# Patient Record
Sex: MALE | Race: WHITE | NOT HISPANIC OR LATINO | Employment: FULL TIME | URBAN - METROPOLITAN AREA
[De-identification: names, ages, dates, MRNs, and addresses within clinical notes are randomized per-mention and may not be internally consistent; named-entity substitution may affect disease eponyms.]

---

## 2017-11-10 PROBLEM — R09.2 RESPIRATORY ARREST: Status: ACTIVE | Noted: 2017-11-10

## 2019-03-18 ENCOUNTER — TELEPHONE (OUTPATIENT)
Dept: ENDOSCOPY | Facility: HOSPITAL | Age: 54
End: 2019-03-18

## 2019-03-18 RX ORDER — SODIUM, POTASSIUM,MAG SULFATES 17.5-3.13G
1 SOLUTION, RECONSTITUTED, ORAL ORAL DAILY
Qty: 1 KIT | Refills: 0 | Status: SHIPPED | OUTPATIENT
Start: 2019-03-18 | End: 2019-03-20

## 2019-03-18 NOTE — TELEPHONE ENCOUNTER
Endoscopy Scheduling Questionnaire:    Call Type:Incoming call    1. Have you been admitted overnight to the hospital in the past 3 months? no  2. Do you get CP and SOB while walking up a flight of stairs? no  3. Have you had a stent placed in the past 12 months? no  4. Have you had a stroke or heart attack in the past 6 months? no  5. Have you had any chest pain in the past 3 months? no      If so, have you been evaluated by your PCP or Cardiologist? no  6. Do you take weight loss medications? no  7. Have you been diagnosed with Diverticulitis within the past 3 months? no  8. Are you having any GI symptoms that you feel need to be evaluated prior to your procedure? no  9. Are you on dialysis? no  10. Are you diabetic? no  11. Do you have any other health issues that you feel might limit your ability to safely have the procedure and/or sedation? no  12. Is the patient over 81 yo? no        If so, has the patient been seen by their PCP or GI in the last 3 months? N/A       -I have reviewed the last colonoscopy for recommendations regarding surveillance and bowel prep  is NA  -I have reviewed the patient's medications and allergies. He is not on high risk medications and will require cardiac clearance. A clearance request NA  -I have verified the pharmacy information. The prep being used is Suprep. The patient's prep instructions were sent via mail..    Date Endoscopy Scheduled: Date: 5/13/19

## 2019-03-26 DIAGNOSIS — R52 PAIN: Primary | ICD-10-CM

## 2019-03-27 ENCOUNTER — HOSPITAL ENCOUNTER (OUTPATIENT)
Dept: RADIOLOGY | Facility: HOSPITAL | Age: 54
Discharge: HOME | End: 2019-03-27
Attending: ORTHOPAEDIC SURGERY
Payer: OTHER GOVERNMENT

## 2019-03-27 ENCOUNTER — OFFICE VISIT (OUTPATIENT)
Dept: ORTHOPEDICS | Facility: CLINIC | Age: 54
End: 2019-03-27
Payer: OTHER GOVERNMENT

## 2019-03-27 VITALS
SYSTOLIC BLOOD PRESSURE: 105 MMHG | RESPIRATION RATE: 12 BRPM | WEIGHT: 246.5 LBS | HEIGHT: 74 IN | HEART RATE: 68 BPM | BODY MASS INDEX: 31.63 KG/M2 | DIASTOLIC BLOOD PRESSURE: 70 MMHG

## 2019-03-27 DIAGNOSIS — G89.29 CHRONIC LEFT SHOULDER PAIN: Primary | ICD-10-CM

## 2019-03-27 DIAGNOSIS — M75.42 IMPINGEMENT SYNDROME OF LEFT SHOULDER: ICD-10-CM

## 2019-03-27 DIAGNOSIS — S43.432A SUPERIOR GLENOID LABRUM LESION OF LEFT SHOULDER, INITIAL ENCOUNTER: ICD-10-CM

## 2019-03-27 DIAGNOSIS — M25.512 CHRONIC LEFT SHOULDER PAIN: Primary | ICD-10-CM

## 2019-03-27 DIAGNOSIS — R52 PAIN: ICD-10-CM

## 2019-03-27 PROCEDURE — 99999 PR PBB SHADOW E&M-EST. PATIENT-LVL III: ICD-10-PCS | Mod: PBBFAC,,, | Performed by: ORTHOPAEDIC SURGERY

## 2019-03-27 PROCEDURE — 73030 XR SHOULDER COMPLETE 2 OR MORE VIEWS LEFT: ICD-10-PCS | Mod: 26,LT,, | Performed by: RADIOLOGY

## 2019-03-27 PROCEDURE — 99999 PR PBB SHADOW E&M-EST. PATIENT-LVL III: CPT | Mod: PBBFAC,,, | Performed by: ORTHOPAEDIC SURGERY

## 2019-03-27 PROCEDURE — 99204 PR OFFICE/OUTPT VISIT, NEW, LEVL IV, 45-59 MIN: ICD-10-PCS | Mod: S$PBB,,, | Performed by: ORTHOPAEDIC SURGERY

## 2019-03-27 PROCEDURE — 99204 OFFICE O/P NEW MOD 45 MIN: CPT | Mod: S$PBB,,, | Performed by: ORTHOPAEDIC SURGERY

## 2019-03-27 PROCEDURE — 73030 X-RAY EXAM OF SHOULDER: CPT | Mod: TC,LT

## 2019-03-27 PROCEDURE — 99213 OFFICE O/P EST LOW 20 MIN: CPT | Mod: PBBFAC,25,PN | Performed by: ORTHOPAEDIC SURGERY

## 2019-03-27 PROCEDURE — 73030 X-RAY EXAM OF SHOULDER: CPT | Mod: 26,LT,, | Performed by: RADIOLOGY

## 2019-03-27 RX ORDER — MULTIVITAMIN
1 TABLET ORAL DAILY
COMMUNITY
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
## 2019-03-28 ENCOUNTER — TELEPHONE (OUTPATIENT)
Dept: PAIN MEDICINE | Facility: CLINIC | Age: 54
End: 2019-03-28

## 2019-03-28 DIAGNOSIS — M19.012 PRIMARY OSTEOARTHRITIS OF LEFT SHOULDER: Primary | ICD-10-CM

## 2019-03-28 NOTE — TELEPHONE ENCOUNTER
----- Message from Iona Kenyon LPN sent at 3/27/2019  4:12 PM CDT -----  Can you place orders for me?    ----- Message -----  From: Zaira Pablo LPN  Sent: 3/27/2019   3:54 PM  To: Tobias Patterson Staff    Good afternoon,    Dr. Patton would like for this patient to be scheduled with Dr. Collado for a left shoulder injection ( x-ray guided).

## 2019-03-28 NOTE — TELEPHONE ENCOUNTER
----- Message from Viviana Lea PA-C sent at 3/28/2019  7:19 AM CDT -----  Done... Are we not seeing them anymore in clinic before scheduling procedures?    ----- Message -----  From: Iona Kenyon LPN  Sent: 3/27/2019   4:12 PM  To: Viviana Lea PA-C    Can you place orders for me?    ----- Message -----  From: Zaira Pablo LPN  Sent: 3/27/2019   3:54 PM  To: Tobias Patterson Staff    Good afternoon,    Dr. Patton would like for this patient to be scheduled with Dr. Collado for a left shoulder injection ( x-ray guided).

## 2019-03-29 ENCOUNTER — TELEPHONE (OUTPATIENT)
Dept: PAIN MEDICINE | Facility: CLINIC | Age: 54
End: 2019-03-29

## 2019-03-29 NOTE — TELEPHONE ENCOUNTER
----- Message from Rubina Dale sent at 3/29/2019 12:49 PM CDT -----  Contact: self- [phone]  ..Type:  Patient Returning Call    Who Called:Colby  Who Left Message for Patient:nurse  Does the patient know what this is regarding?:setting an appointment  Would the patient rather a call back or a response via [facility]? Call back   Best Call Back Number:[phone]  Additional Information:

## 2019-04-02 ENCOUNTER — TELEPHONE (OUTPATIENT)
Dept: ORTHOPEDICS | Facility: CLINIC | Age: 54
End: 2019-04-02

## 2019-04-02 NOTE — TELEPHONE ENCOUNTER
Contacted patient in reference to rescheduling his 7/1/19 appointment. No answer. Left voice message.//DG

## 2019-04-08 ENCOUNTER — TELEPHONE (OUTPATIENT)
Dept: ORTHOPEDICS | Facility: CLINIC | Age: 54
End: 2019-04-08

## 2019-04-17 ENCOUNTER — HOSPITAL ENCOUNTER (OUTPATIENT)
Facility: HOSPITAL | Age: 54
Discharge: HOME | End: 2019-04-17
Attending: ANESTHESIOLOGY | Admitting: ANESTHESIOLOGY
Payer: OTHER GOVERNMENT

## 2019-04-17 ENCOUNTER — APPOINTMENT (OUTPATIENT)
Dept: RADIOLOGY | Facility: HOSPITAL | Age: 54
End: 2019-04-17
Attending: PHYSICIAN ASSISTANT
Payer: OTHER GOVERNMENT

## 2019-04-17 VITALS
RESPIRATION RATE: 16 BRPM | HEART RATE: 55 BPM | HEIGHT: 74 IN | WEIGHT: 241 LBS | TEMPERATURE: 98 F | OXYGEN SATURATION: 97 % | BODY MASS INDEX: 30.93 KG/M2 | DIASTOLIC BLOOD PRESSURE: 69 MMHG | SYSTOLIC BLOOD PRESSURE: 120 MMHG

## 2019-04-17 DIAGNOSIS — M19.012 PRIMARY OSTEOARTHRITIS OF LEFT SHOULDER: Primary | ICD-10-CM

## 2019-04-17 PROCEDURE — 20610 PR DRAIN/INJECT LARGE JOINT/BURSA: ICD-10-PCS | Mod: LT,,, | Performed by: ANESTHESIOLOGY

## 2019-04-17 PROCEDURE — 20610 DRAIN/INJ JOINT/BURSA W/O US: CPT | Mod: LT,,, | Performed by: ANESTHESIOLOGY

## 2019-04-17 PROCEDURE — 63600175 PHARM REV CODE 636 W HCPCS

## 2019-04-17 PROCEDURE — 20610 DRAIN/INJ JOINT/BURSA W/O US: CPT | Mod: LT

## 2019-04-17 PROCEDURE — 77002 NEEDLE LOCALIZATION BY XRAY: CPT

## 2019-04-17 PROCEDURE — 77002 PR FLUOROSCOPIC GUIDANCE NEEDLE PLACEMENT: ICD-10-PCS | Mod: 26,,, | Performed by: ANESTHESIOLOGY

## 2019-04-17 PROCEDURE — 25000003 PHARM REV CODE 250

## 2019-04-17 PROCEDURE — 77002 NEEDLE LOCALIZATION BY XRAY: CPT | Mod: 26,,, | Performed by: ANESTHESIOLOGY

## 2019-04-17 PROCEDURE — 25000003 PHARM REV CODE 250: Performed by: ANESTHESIOLOGY

## 2019-04-17 PROCEDURE — 77002 NEEDLE LOCALIZATION BY XRAY: CPT | Mod: TC

## 2019-04-17 PROCEDURE — 63600175 PHARM REV CODE 636 W HCPCS: Performed by: ANESTHESIOLOGY

## 2019-04-17 RX ORDER — METHYLPREDNISOLONE ACETATE 40 MG/ML
INJECTION, SUSPENSION INTRA-ARTICULAR; INTRALESIONAL; INTRAMUSCULAR; SOFT TISSUE
Status: DISCONTINUED | OUTPATIENT
Start: 2019-04-17 | End: 2019-04-17 | Stop reason: HOSPADM

## 2019-04-17 RX ORDER — LIDOCAINE HYDROCHLORIDE 20 MG/ML
INJECTION, SOLUTION EPIDURAL; INFILTRATION; INTRACAUDAL; PERINEURAL
Status: DISCONTINUED | OUTPATIENT
Start: 2019-04-17 | End: 2019-04-17 | Stop reason: HOSPADM

## 2019-04-17 NOTE — PLAN OF CARE
Patient dc'd home with family via wheelchair, verbalized understanding of DC instructions, voiced no complaints.  Patient stood at side of bed and transferred to  with no new motor or neurodeficits, nuerologically intact

## 2019-04-17 NOTE — DISCHARGE SUMMARY
[facility]  Discharge Note       Description of Procedure: Left Shoulder Pain under Fluoroscopic Guidance    Procedure Date: 4/17/2019    Admit Date: 4/17/2019  Discharge Date: 4/17/2019     Attending Physician: Tobias Patterson   Discharge Provider: Tobias Patterson    Preoperative Diagnosis: Left Shoulder Pain     Postoperative Diagnosis: as above, same as preoperative diagnosis    Discharged Condition: Stable    Hospital Course: Patient was admitted for an outpatient procedure. The procedure was tolerated well with no complications.    Final Diagnoses: Same as principal problem.    Disposition: Home, self-care.    Follow up/Patient Instructions:  Follow-up in clinic in 2-3 weeks.    Medications: No medications were prescribed today. The patient was advised to resume normal medication regimen without change.  Specific information was provided regarding restarting any anticoagulant/s.    Discharge Procedure Orders (must include Diet, Follow-up, Activity):  Light activity for the remainder of the day, resume normal activity tomorrow. Resume normal diet. Follow-up in clinic in 2-3 weeks.

## 2019-04-17 NOTE — OP NOTE
PROCEDURE: Shoulder injection under fluoroscopic guidance     SIDE: Left       PROCEDURE DATE: 4/17/2019     PREOPERATIVE DIAGNOSIS: Shoulder pain  POSTOPERATIVE DIAGNOSIS: same     PROVIDER: Leonardo Collado MD  Assistant(s): None     ANESTHESIA: Local     INDICATION: The patient has a history of pain due to sacroiliitis unresponsive to conservative treatments. Fluoroscopy was used to optimize visualization of needle placement and to maximize safety.         PROCEDURE DESCRIPTION: The patient was seen and identified in the preoperative area. Risks, benefits, complications, and alternatives were discussed with the patient. The patient agreed to proceed with the procedure and signed the consent. The site and side of the procedure was identified and marked. An IV was not started.     The patient was taken to the procedural suite. The patient was positioned in prone orientation on procedure table. A time out was performed prior to any intervention. The procedure, site, side, and allergies were stated and agreed to by all present. The lumbosacral area was widely prepped with ChloraPrep. The procedural site was draped in usual sterile fashion. Vital signs were closely monitored throughout this procedure. Conscious sedation was not used for this procedure.         Shoulder Injection:  The Left  shoulder was prepped and draped in the usual sterile fashion with ChloraPrep. 1% lidocaine through a 27-gauge needle was used to anesthetize the target area, and then a 3-1/2 inch 25-gauge Quincke-point needle advanced under direct vision fluoroscopy toward the Left -sided shoulder joint through the lateral approach, and the placement of the needle was confirmed by injecting 1 mL of iodine contrast under live fluoroscopy, which demonstrated excellent placement of the needle. Aspiration was negative for blood and CSF and air. Subsequently 3 mL solution containing 1 mL of Methylprednisolone (40 mg/mL) and 2 mL 1% PF Lidocaine was  injected, and then the needle was withdrawn. The patient tolerated the procedure very well and was brought to the postprocedure recovery room in excellent condition     Description of Findings: Not applicable     Prosthetic devices, grafts, tissues, or devices implanted: None     Specimen Removed: No     Estimated Blood Loss: minimal     COMPLICATIONS: None     DISPOSITION / PLANS: The patient was transferred to the recovery area in a stable condition for observation. The patient was reexamined prior to discharge. There was no evidence of acute neurologic injury following the procedure.  Patient was discharged from the recovery room after meeting discharge criteria. Home discharge instructions were given to the patient by the staff.

## 2019-05-13 ENCOUNTER — ANESTHESIA (OUTPATIENT)
Dept: ENDOSCOPY | Facility: HOSPITAL | Age: 54
End: 2019-05-13
Payer: OTHER GOVERNMENT

## 2019-05-13 ENCOUNTER — HOSPITAL ENCOUNTER (OUTPATIENT)
Facility: HOSPITAL | Age: 54
Discharge: HOME | End: 2019-05-13
Attending: INTERNAL MEDICINE | Admitting: INTERNAL MEDICINE
Payer: OTHER GOVERNMENT

## 2019-05-13 ENCOUNTER — ANESTHESIA EVENT (OUTPATIENT)
Dept: ENDOSCOPY | Facility: HOSPITAL | Age: 54
End: 2019-05-13
Payer: OTHER GOVERNMENT

## 2019-05-13 VITALS
DIASTOLIC BLOOD PRESSURE: 74 MMHG | HEART RATE: 68 BPM | RESPIRATION RATE: 18 BRPM | HEIGHT: 74 IN | WEIGHT: 234.13 LBS | OXYGEN SATURATION: 97 % | BODY MASS INDEX: 30.05 KG/M2 | SYSTOLIC BLOOD PRESSURE: 107 MMHG | TEMPERATURE: 98 F

## 2019-05-13 DIAGNOSIS — Z12.11 COLON CANCER SCREENING: Primary | ICD-10-CM

## 2019-05-13 PROCEDURE — 63600175 PHARM REV CODE 636 W HCPCS: Performed by: NURSE ANESTHETIST, CERTIFIED REGISTERED

## 2019-05-13 PROCEDURE — G0121 COLON CA SCRN NOT HI RSK IND: ICD-10-PCS | Mod: ,,, | Performed by: INTERNAL MEDICINE

## 2019-05-13 PROCEDURE — G0121 COLON CA SCRN NOT HI RSK IND: HCPCS | Mod: ,,, | Performed by: INTERNAL MEDICINE

## 2019-05-13 PROCEDURE — 37000009 HC ANESTHESIA EA ADD 15 MINS: Performed by: INTERNAL MEDICINE

## 2019-05-13 PROCEDURE — 37000008 HC ANESTHESIA 1ST 15 MINUTES: Performed by: INTERNAL MEDICINE

## 2019-05-13 PROCEDURE — 00812 ANES LWR INTST SCR COLSC: CPT | Performed by: INTERNAL MEDICINE

## 2019-05-13 PROCEDURE — 25000003 PHARM REV CODE 250: Performed by: INTERNAL MEDICINE

## 2019-05-13 PROCEDURE — 25000003 PHARM REV CODE 250: Performed by: NURSE ANESTHETIST, CERTIFIED REGISTERED

## 2019-05-13 PROCEDURE — G0121 COLON CA SCRN NOT HI RSK IND: HCPCS | Performed by: INTERNAL MEDICINE

## 2019-05-13 RX ORDER — PROPOFOL 10 MG/ML
VIAL (ML) INTRAVENOUS
Status: DISCONTINUED | OUTPATIENT
Start: 2019-05-13 | End: 2019-05-13

## 2019-05-13 RX ORDER — SODIUM CHLORIDE, SODIUM LACTATE, POTASSIUM CHLORIDE, CALCIUM CHLORIDE 600; 310; 30; 20 MG/100ML; MG/100ML; MG/100ML; MG/100ML
INJECTION, SOLUTION INTRAVENOUS CONTINUOUS
Status: DISCONTINUED | OUTPATIENT
Start: 2019-05-13 | End: 2019-05-13 | Stop reason: HOSPADM

## 2019-05-13 RX ORDER — LIDOCAINE HYDROCHLORIDE 10 MG/ML
INJECTION INFILTRATION; PERINEURAL
Status: DISCONTINUED | OUTPATIENT
Start: 2019-05-13 | End: 2019-05-13

## 2019-05-13 RX ADMIN — PROPOFOL 30 MG: 10 INJECTION, EMULSION INTRAVENOUS at 01:05

## 2019-05-13 RX ADMIN — PROPOFOL 40 MG: 10 INJECTION, EMULSION INTRAVENOUS at 01:05

## 2019-05-13 RX ADMIN — PROPOFOL 110 MG: 10 INJECTION, EMULSION INTRAVENOUS at 01:05

## 2019-05-13 RX ADMIN — SODIUM CHLORIDE, SODIUM LACTATE, POTASSIUM CHLORIDE, AND CALCIUM CHLORIDE: .6; .31; .03; .02 INJECTION, SOLUTION INTRAVENOUS at 12:05

## 2019-05-13 RX ADMIN — LIDOCAINE HYDROCHLORIDE 50 MG: 10 INJECTION, SOLUTION INFILTRATION; PERINEURAL at 01:05

## 2019-05-13 NOTE — ANESTHESIA PREPROCEDURE EVALUATION
05/13/2019  Colby Yepez is a 54 y.o., male.    Anesthesia Evaluation    I have reviewed the Patient Summary Reports.    I have reviewed the Nursing Notes.   I have reviewed the Medications.     Review of Systems  Anesthesia Hx:  No problems with previous Anesthesia    Social:  Non-Smoker    Hematology/Oncology:  Hematology Normal   Oncology Normal     EENT/Dental:EENT/Dental Normal   Cardiovascular:  Cardiovascular Normal     Pulmonary:  Pulmonary Normal    Renal/:   Chronic Renal Disease renal calculi    Hepatic/GI:   Bowel Prep. Liver Disease,    Musculoskeletal:   Arthritis     Neurological:  Neurology Normal    Endocrine:  Endocrine Normal    Dermatological:  Skin Normal    Psych:  Psychiatric Normal           Physical Exam  General:  Well nourished    Airway/Jaw/Neck:  Airway Findings: Mouth Opening: Normal General Airway Assessment: Adult       Chest/Lungs:  Chest/Lungs Findings: Clear to auscultation     Heart/Vascular:  Heart Findings: Rate: Normal             Anesthesia Plan  Type of Anesthesia, risks & benefits discussed:  Anesthesia Type:  MAC  Patient's Preference:   Intra-op Monitoring Plan: standard ASA monitors  Intra-op Monitoring Plan Comments:   Post Op Pain Control Plan:   Post Op Pain Control Plan Comments:   Induction:   IV  Beta Blocker:  Patient is not currently on a Beta-Blocker (No further documentation required).       Informed Consent: Patient understands risks and agrees with Anesthesia plan.  Questions answered. Anesthesia consent signed with patient.  ASA Score: 2     Day of Surgery Review of History & Physical: I have interviewed and examined the patient. I have reviewed the patient's H&P dated:  There are no significant changes.          Ready For Surgery From Anesthesia Perspective.

## 2019-05-13 NOTE — ANESTHESIA RELEASE NOTE
"Anesthesia Release from PACU Note    Patient: Colby Yeepz    Procedure(s) Performed: Procedure(s) (LRB):  COLONOSCOPY (N/A)    Anesthesia type: MAC    Post pain: Adequate analgesia    Post assessment: no apparent anesthetic complications    Last Vitals:   Visit Vitals  /72 (BP Location: Left arm, Patient Position: Lying)   Pulse 67   Temp 36.7 °C (98.1 °F) (Skin)   Resp 18   Ht 6' 2" (1.88 m)   Wt 106.2 kg (234 lb 2.1 oz)   SpO2 96%   BMI 30.06 kg/m²       Post vital signs: stable    Level of consciousness: awake    Nausea/Vomiting: no nausea/no vomiting    Complications: none    Airway Patency: patent    Respiratory: unassisted    Cardiovascular: stable and blood pressure at baseline    Hydration: euvolemic  "

## 2019-05-13 NOTE — [employer] PATIENT INSTRUCTIONS
Discharge Summary/Instructions after an Endoscopic Procedure  Patient Name: Colby Yepez  Patient MRN: [id number]  Patient YOB: 1965  Monday, May 13, 2019 Soha Norotn MD  RESTRICTIONS:  During your procedure today, you received medications for sedation.  These   medications may affect your judgment, balance and coordination.  Therefore,   for 24 hours, you have the following restrictions:   - DO NOT drive a car, operate machinery, make legal/financial decisions,   sign important papers or drink alcohol.    ACTIVITY:  Today: no heavy lifting, straining or running due to procedural   sedation/anesthesia.  The following day: return to full activity including work.  DIET:  Eat and drink normally unless instructed otherwise.     TREATMENT FOR COMMON SIDE EFFECTS:  - Mild abdominal pain, nausea, belching, bloating or excessive gas:  rest,   eat lightly and use a heating pad.  - Sore Throat: treat with throat lozenges and/or gargle with warm salt   water.  - Because air was used during the procedure, expelling large amounts of air   from your rectum or belching is normal.  - If a bowel prep was taken, you may not have a bowel movement for 1-3 days.    This is normal.  SYMPTOMS TO WATCH FOR AND REPORT TO YOUR PHYSICIAN:  1. Abdominal pain or bloating, other than gas cramps.  2. Chest pain.  3. Back pain.  4. Signs of infection such as: chills or fever occurring within 24 hours   after the procedure.  5. Rectal bleeding, which would show as bright red, maroon, or black stools.   (A tablespoon of blood from the rectum is not serious, especially if   hemorrhoids are present.)  6. Vomiting.  7. Weakness or dizziness.  GO DIRECTLY TO THE NEAREST EMERGENCY ROOM IF YOU HAVE ANY OF THE FOLLOWING:      Difficulty breathing              Chills and/or fever over 101 F   Persistent vomiting and/or vomiting blood   Severe abdominal pain   Severe chest pain   Black, tarry stools   Bleeding- more than one  tablespoon   Any other symptom or condition that you feel may need urgent attention  Your doctor recommends these additional instructions:  If any biopsies were taken, your doctors clinic will contact you in 1 to 2   weeks with any results.  - Patient has a contact number available for emergencies.  The signs and   symptoms of potential delayed complications were discussed with the   patient.  Return to normal activities tomorrow.  Written discharge   instructions were provided to the patient.   - Discharge patient to home (via wheelchair).   - Resume previous diet today.   - Continue present medications.   - Repeat colonoscopy in 10 years for screening purposes.  For questions, problems or results please call your physician Soha Norton MD at Work:  [phone]  If you have any questions about the above instructions, call the GI   department at [phone] or call the endoscopy unit at [phone]   from 7am until 3 pm.  [facility] [place], EMERGENCY ROOM PHONE NUMBER:   [phone]  IF A COMPLICATION OR EMERGENCY SITUATION ARISES AND YOU ARE UNABLE TO REACH   YOUR PHYSICIAN - GO DIRECTLY TO THE EMERGENCY ROOM.  I have read or have had read to me these discharge instructions for my   procedure and have received a written copy.  I understand these   instructions and will follow-up with my physician if I have any questions.     __________________________________       _____________________________________  Nurse Signature                                          Patient/Designated   Responsible Party Signature  MD Soha Giraldo MD  5/13/2019 1:32:41 PM  This report has been verified and signed electronically.  [employer]

## 2019-05-13 NOTE — H&P
PRE PROCEDURE H&P    Patient Name: Colby Yepez  MRN: [id number]  : 1965  Date of Procedure:  2019  Referring Physician: Cuong Riojas MD  Primary Physician: Cuong Riojas MD  Procedure Physician: Soha Norton MD       Planned Procedure: Colonoscopy  Diagnosis: screening for colon cancer  Chief Complaint: Same as above    HPI: Patient is an 54 y.o. male is here for the above.     Last colonoscopy: no prior   Family history: negative   Anticoagulation: none     Past Medical History:   Past Medical History:   Diagnosis Date    Allergy     Kidney stone     Liver cyst         Past Surgical History:  Past Surgical History:   Procedure Laterality Date    SINUS SURGERY      TONSILLECTOMY, ADENOIDECTOMY, BILATERAL MYRINGOTOMY AND TUBES      VASECTOMY  10/27/2017        Home Medications:  Prior to Admission medications    Medication Sig Start Date End Date Taking? Authorizing Provider   azithromycin (ZITHROMAX Z-MARLY) 250 MG tablet tad 19  Yes Cuong Riojas MD   ibuprofen (ADVIL,MOTRIN) 800 MG tablet TAKE ONE TABLET BY MOUTH THREE TIMES DAILY AS NEEDED FOR PAIN 14  Yes Cuong Riojas MD   ipratropium (ATROVENT) 0.03 % nasal spray 2 sprays by Nasal route 3 (three) times daily. 19 Yes Cuong Riojas MD   meloxicam (MOBIC) 15 MG tablet Take 1 tablet (15 mg total) by mouth once daily. 4/3/19 4/2/20 Yes Cuong Riojas MD   multivitamin (ONE DAILY MULTIVITAMIN) per tablet Take 1 tablet by mouth.   Yes Historical Provider, MD   fluticasone (FLONASE) 50 mcg/actuation nasal spray 2 sprays by Each Nare route once daily. 14   Cuong Riojas MD   tiZANidine (ZANAFLEX) 4 MG tablet Take 1 tablet (4 mg total) by mouth 3 (three) times daily as needed. 4/3/19 4/2/20  Cuong Riojas MD        Allergies:  Review of patient's allergies indicates:   Allergen Reactions    Tramadol         Social History:   Social History     Socioeconomic History    Marital status:      Spouse name:  "Not on file    Number of children: 3    Years of education: Not on file    Highest education level: Not on file   Occupational History     Employer:  [employer]   Social Needs    Financial resource strain: Not on file    Food insecurity:     Worry: Not on file     Inability: Not on file    Transportation needs:     Medical: Not on file     Non-medical: Not on file   Tobacco Use    Smoking status: Never Smoker    Smokeless tobacco: Never Used   Substance and Sexual Activity    Alcohol use: Yes     Comment: monthly    Drug use: No    Sexual activity: Not on file   Lifestyle    Physical activity:     Days per week: Not on file     Minutes per session: Not on file    Stress: Not on file   Relationships    Social connections:     Talks on phone: Not on file     Gets together: Not on file     Attends Restoration service: Not on file     Active member of club or organization: Not on file     Attends meetings of clubs or organizations: Not on file     Relationship status: Not on file   Other Topics Concern    Not on file   Social History Narrative    Not on file       Family History:  History reviewed. No pertinent family history.    ROS: No acute cardiac events, no acute respiratory complaints.     Physical Exam (all patients):    /72 (BP Location: Left arm, Patient Position: Lying)   Pulse 67   Temp 98.1 °F (36.7 °C) (Skin)   Resp 18   Ht 6' 2" (1.88 m)   Wt 106.2 kg (234 lb 2.1 oz)   SpO2 96%   BMI 30.06 kg/m²   Lungs: Clear to auscultation bilaterally, respirations unlabored  Heart: Regular rate and rhythm, S1 and S2 normal, no obvious murmurs  Abdomen:         Soft, non-tender, bowel sounds normal, no masses, no organomegaly    Lab Results   Component Value Date    WBC 6.7 03/18/2019    MCV 94 03/18/2019    RDW 13.2 03/18/2019     (L) 03/18/2019    INR 1.2 08/11/2010    GLU 83 03/18/2019    BUN 21 03/18/2019     03/18/2019    K 4.4 03/18/2019     03/18/2019    "     SEDATION PLAN: per anesthesia      History reviewed, vital signs satisfactory, cardiopulmonary status satisfactory, sedation options, risks and plans have been discussed with the patient  All their questions were answered and the patient agrees to the sedation procedures as planned and the patient is deemed an appropriate candidate for the sedation as planned.    Procedure explained to patient, informed consent obtained and placed in chart.    Soha Norton  5/13/2019  1:15 PM

## 2019-05-13 NOTE — ANESTHESIA POSTPROCEDURE EVALUATION
Anesthesia Post Evaluation    Patient: Colby Yepez    Procedure(s) Performed: Procedure(s) (LRB):  COLONOSCOPY (N/A)    Final Anesthesia Type: MAC  Patient location during evaluation: PACU  Patient participation: Yes- Able to Participate  Level of consciousness: awake and alert  Post-procedure vital signs: reviewed and stable  Pain management: adequate  Airway patency: patent  PONV status at discharge: No PONV  Anesthetic complications: no      Cardiovascular status: blood pressure returned to baseline  Respiratory status: unassisted  Hydration status: euvolemic  Follow-up not needed.          Vitals Value Taken Time   /72 5/13/2019 12:15 PM   Temp 36.7 °C (98.1 °F) 5/13/2019 12:15 PM   Pulse 67 5/13/2019 12:15 PM   Resp 18 5/13/2019 12:15 PM   SpO2 96 % 5/13/2019 12:15 PM         No case tracking events are documented in the log.      Pain/Kevin Score: Kevin Score: 9 (5/13/2019  1:35 PM)

## 2019-05-13 NOTE — TRANSFER OF CARE
"Anesthesia Transfer of Care Note    Patient: Colby Yepez    Procedure(s) Performed: Procedure(s) (LRB):  COLONOSCOPY (N/A)    Patient location: PACU    Anesthesia Type: MAC    Transport from OR: Transported from OR on room air with adequate spontaneous ventilation    Post pain: adequate analgesia    Post assessment: no apparent anesthetic complications    Post vital signs: stable    Level of consciousness: awake    Nausea/Vomiting: no nausea/vomiting    Complications: none    Transfer of care protocol was followed      Last vitals:   Visit Vitals  /72 (BP Location: Left arm, Patient Position: Lying)   Pulse 67   Temp 36.7 °C (98.1 °F) (Skin)   Resp 18   Ht 6' 2" (1.88 m)   Wt 106.2 kg (234 lb 2.1 oz)   SpO2 96%   BMI 30.06 kg/m²     "

## 2019-05-13 NOTE — DISCHARGE SUMMARY
Endoscopy Discharge Summary      Admit Date: 5/13/2019    Discharge Date and Time:  5/13/2019 1:33 PM    Attending Physician: Soha Norton MD     Discharge Physician: Soha Norton MD     Principal Admitting Diagnoses: Colon cancer screening         Discharge Diagnosis: The encounter diagnosis was Colon cancer screening.     Discharged Condition: Good    Indication for Admission: Colon cancer screening     Hospital Course: Patient was admitted for an inpatient procedure and tolerated the procedure well with no complications.    Significant Diagnostic Studies: Colonoscopy     Pathology (if any):  Specimen (12h ago, onward)    None          Estimated Blood Loss: 0 ml.    Discussed with: patient.    Disposition: Home.    Follow Up/Patient Instructions:   Current Discharge Medication List      CONTINUE these medications which have NOT CHANGED    Details   azithromycin (ZITHROMAX Z-MARLY) 250 MG tablet tad  Qty: 6 tablet, Refills: 0    Associated Diagnoses: Acute non-recurrent maxillary sinusitis      ibuprofen (ADVIL,MOTRIN) 800 MG tablet TAKE ONE TABLET BY MOUTH THREE TIMES DAILY AS NEEDED FOR PAIN  Qty: 60 tablet, Refills: 5      ipratropium (ATROVENT) 0.03 % nasal spray 2 sprays by Nasal route 3 (three) times daily.  Qty: 30 mL, Refills: 0    Associated Diagnoses: Acute non-recurrent maxillary sinusitis      meloxicam (MOBIC) 15 MG tablet Take 1 tablet (15 mg total) by mouth once daily.  Qty: 30 tablet, Refills: 2    Associated Diagnoses: Lumbar pain      multivitamin (ONE DAILY MULTIVITAMIN) per tablet Take 1 tablet by mouth.      fluticasone (FLONASE) 50 mcg/actuation nasal spray 2 sprays by Each Nare route once daily.  Qty: 16 g, Refills: 11    Associated Diagnoses: Allergic rhinitis, cause unspecified      tiZANidine (ZANAFLEX) 4 MG tablet Take 1 tablet (4 mg total) by mouth 3 (three) times daily as needed.  Qty: 40 tablet, Refills: 1    Associated Diagnoses: Lumbar pain             Discharge Procedure  Orders   Diet general     Call MD for:  temperature >100.4     Call MD for:  persistent nausea and vomiting     Call MD for:  severe uncontrolled pain     Call MD for:  difficulty breathing, headache or visual disturbances     Activity as tolerated       Follow-up Information     Cuong Riojas MD.    Specialty:  Family Medicine  Why:  As needed  Contact information:  [address] [facility]  [address]  [facility] [id number] [phone]

## 2019-05-13 NOTE — PLAN OF CARE
Dr Norton came to bedside and discussed findings. NO N/V,  no abdominal pain, no GI bleeding, and vitals stable.  Pt discharged from unit.

## 2019-05-13 NOTE — DISCHARGE INSTRUCTIONS
Diverticulosis    Diverticulosis means that small pouches have formed in the wall of your large intestine (colon). Most often, this problem causes no symptoms and is common as people age. But the pouches in the colon are at risk of becoming infected. When this happens, the condition is called diverticulitis. Although most people with diverticulosis never develop diverticulitis, it is still not uncommon. Rectal bleeding can also occur and in less common situations, a type of colon inflammation called colitis.  While most people do not have symptoms, some people with diverticulosis may have:  · Abdominal cramps and pain  · Bloating  · Constipation  · Change in bowel habits  Causes  The exact cause of diverticulosis (and diverticulitis) has not been proved, but a few things are associated with the condition:  · Low-fiber diet  · Constipation  · Lack of exercise  Your healthcare provider will talk with you about how to manage your condition. Diet changes may be all that are needed to help control diverticulosis and prevent progression to diverticulitis. If you develop diverticulitis, you will likely need other treatments.  Home care  You may be told to take fiber supplements daily. Fiber adds bulk to the stool so that it passes through the colon more easily. Stool softeners may be recommended. You may also be given medications for pain relief. Be sure to take all medications as directed.  In the past, people were told to avoid corn, nuts, and seeds. This is no longer necessary.  Follow these guidelines when caring for yourself at home:  · Eat unprocessed foods that are high in fiber. Whole grains, fruits, and vegetables are good choices.  · Drink 6 to 8 glasses of water every day unless your healthcare provider has you limit how much fluid you should have.  · Watch for changes in your bowel movements. Tell your provider if you notice any changes.  · Begin an exercise program. Ask your provider how to get started.  Generally, walking is the best.  · Get plenty of rest and sleep.  Follow-up care  Follow up with your healthcare provider, or as advised. Regular visits may be needed to check on your health. Sometimes special procedures such as colonoscopy, are needed after an episode of diverticulitis or blooding. Be sure to keep all your appointments.  If a stool sample was taken, or cultures were done, you should be told if they are positive, or if your treatment needs to be changed. You can call as directed for the results.  If X-rays were done, a radiologist will look at them. You will be told if there is a change in your treatment.  If antibiotics were prescribed, be sure to finish them all.  When to seek medical advice  Call your healthcare provider right away if any of these occur:  · Fever of 100.4°F (38°C) or higher, or as directed by your healthcare provider  · Severe cramps in the lower left side of the abdomen or pain that is getting worse  · Tenderness in the lower left side of the abdomen or worsening pain throughout the abdomen  · Diarrhea or constipation that doesn't get better within 24 hours  · Nausea and vomiting  · Bleeding from the rectum  Call 911  Call emergency services if any of the following occur:  · Trouble breathing  · Confusion  · Very drowsy or trouble awakening  · Fainting or loss of consciousness  · Rapid heart rate  · Chest pain  Date Last Reviewed: 12/30/2015 © 2000-2017 [employer]. [address] [place] [id number]. All rights reserved. This information is not intended as a substitute for professional medical care. Always follow your healthcare professional's instructions.        Colonoscopy     A camera attached to a flexible tube with a viewing lens is used to take video pictures.     Colonoscopy is a test to view the inside of your lower digestive tract (colon and rectum). Sometimes it can show the last part of the small intestine (ileum). During the test, small pieces  of tissue may be removed for testing. This is called a biopsy. Small growths, such as polyps, may also be removed.   Why is colonoscopy done?  The test is done to help look for colon cancer. And it can help find the source of abdominal pain, bleeding, and changes in bowel habits. It may be needed once a year, depending on factors such as your:  · Age  · Health history  · Family health history  · Symptoms  · Results from any prior colonoscopy  Risks and possible complications  These include:  · Bleeding               · A puncture or tear in the colon   · Risks of anesthesia  · A cancer lesion not being seen  Getting ready   To prepare for the test:  · Talk with your healthcare provider about the risks of the test (see below). Also ask your healthcare provider about alternatives to the test.  · Tell your healthcare provider about any medicines you take. Also tell him or her about any health conditions you may have.  · Make sure your rectum and colon are empty for the test. Follow the diet and bowel prep instructions exactly. If you dont, the test may need to be rescheduled.  · Plan for a friend or family member to drive you home after the test.     Colonoscopy provides an inside view of the entire colon.     You may discuss the results with your doctor right away or at a future visit.  During the test   The test is usually done in the hospital on an outpatient basis. This means you go home the same day. The procedure takes about 30 minutes. During that time:  · You are given relaxing (sedating) medicine through an IV line. You may be drowsy, or fully asleep.  · The healthcare provider will first give you a physical exam to check for anal and rectal problems.  · Then the anus is lubricated and the scope inserted.  · If you are awake, you may have a feeling similar to needing to have a bowel movement. You may also feel pressure as air is pumped into the colon. Its OK to pass gas during the procedure.  · Biopsy, polyp  removal, or other treatments may be done during the test.  After the test   You may have gas right after the test. It can help to try to pass it to help prevent later bloating. Your healthcare provider may discuss the results with you right away. Or you may need to schedule a follow-up visit to talk about the results. After the test, you can go back to your normal eating and other activities. You may be tired from the sedation and need to rest for a few hours.  Date Last Reviewed: 11/1/2016 © 2000-2017 [employer]. [address] [place] [id number]. All rights reserved. This information is not intended as a substitute for professional medical care. Always follow your healthcare professional's instructions.

## 2019-07-02 ENCOUNTER — OFFICE VISIT (OUTPATIENT)
Dept: ORTHOPEDICS | Facility: CLINIC | Age: 54
End: 2019-07-02
Payer: OTHER GOVERNMENT

## 2019-07-02 VITALS
HEART RATE: 54 BPM | SYSTOLIC BLOOD PRESSURE: 108 MMHG | WEIGHT: 234 LBS | DIASTOLIC BLOOD PRESSURE: 67 MMHG | HEIGHT: 74 IN | BODY MASS INDEX: 30.03 KG/M2

## 2019-07-02 DIAGNOSIS — M67.814 TENDINOSIS OF LEFT SHOULDER: ICD-10-CM

## 2019-07-02 DIAGNOSIS — G89.29 CHRONIC LEFT SHOULDER PAIN: Primary | ICD-10-CM

## 2019-07-02 DIAGNOSIS — M75.42 IMPINGEMENT SYNDROME OF LEFT SHOULDER: ICD-10-CM

## 2019-07-02 DIAGNOSIS — M25.512 CHRONIC LEFT SHOULDER PAIN: Primary | ICD-10-CM

## 2019-07-02 DIAGNOSIS — S43.432A SUPERIOR GLENOID LABRUM LESION OF LEFT SHOULDER, INITIAL ENCOUNTER: ICD-10-CM

## 2019-07-02 DIAGNOSIS — M75.82 TENDONITIS OF LEFT ROTATOR CUFF: ICD-10-CM

## 2019-07-02 PROCEDURE — 99213 OFFICE O/P EST LOW 20 MIN: CPT | Mod: PBBFAC | Performed by: ORTHOPAEDIC SURGERY

## 2019-07-02 PROCEDURE — 99999 PR PBB SHADOW E&M-EST. PATIENT-LVL III: ICD-10-PCS | Mod: PBBFAC,,, | Performed by: ORTHOPAEDIC SURGERY

## 2019-07-02 PROCEDURE — 99214 OFFICE O/P EST MOD 30 MIN: CPT | Mod: S$PBB,,, | Performed by: ORTHOPAEDIC SURGERY

## 2019-07-02 PROCEDURE — 99999 PR PBB SHADOW E&M-EST. PATIENT-LVL III: CPT | Mod: PBBFAC,,, | Performed by: ORTHOPAEDIC SURGERY

## 2019-07-02 PROCEDURE — 99214 PR OFFICE/OUTPT VISIT, EST, LEVL IV, 30-39 MIN: ICD-10-PCS | Mod: S$PBB,,, | Performed by: ORTHOPAEDIC SURGERY
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
## 2019-07-25 ENCOUNTER — OFFICE VISIT (OUTPATIENT)
Dept: PAIN MEDICINE | Facility: CLINIC | Age: 54
End: 2019-07-25
Payer: OTHER GOVERNMENT

## 2019-07-25 ENCOUNTER — HOSPITAL ENCOUNTER (OUTPATIENT)
Facility: HOSPITAL | Age: 54
Discharge: HOME | End: 2019-07-25
Attending: ANESTHESIOLOGY | Admitting: ANESTHESIOLOGY
Payer: OTHER GOVERNMENT

## 2019-07-25 VITALS
SYSTOLIC BLOOD PRESSURE: 109 MMHG | HEART RATE: 50 BPM | BODY MASS INDEX: 30.17 KG/M2 | OXYGEN SATURATION: 97 % | WEIGHT: 235.13 LBS | HEIGHT: 74 IN | RESPIRATION RATE: 16 BRPM | TEMPERATURE: 98 F | DIASTOLIC BLOOD PRESSURE: 70 MMHG

## 2019-07-25 VITALS
SYSTOLIC BLOOD PRESSURE: 94 MMHG | HEIGHT: 74 IN | RESPIRATION RATE: 18 BRPM | BODY MASS INDEX: 32.08 KG/M2 | WEIGHT: 250 LBS | DIASTOLIC BLOOD PRESSURE: 65 MMHG | HEART RATE: 64 BPM

## 2019-07-25 DIAGNOSIS — M25.519 SHOULDER PAIN: Primary | ICD-10-CM

## 2019-07-25 DIAGNOSIS — M67.912 ROTATOR CUFF DYSFUNCTION, LEFT: ICD-10-CM

## 2019-07-25 DIAGNOSIS — M77.8 TENDINITIS OF LEFT SHOULDER: ICD-10-CM

## 2019-07-25 DIAGNOSIS — M25.512 CHRONIC LEFT SHOULDER PAIN: Primary | ICD-10-CM

## 2019-07-25 DIAGNOSIS — M75.42 IMPINGEMENT SYNDROME OF LEFT SHOULDER: ICD-10-CM

## 2019-07-25 DIAGNOSIS — S43.432A SUPERIOR GLENOID LABRUM LESION OF LEFT SHOULDER, INITIAL ENCOUNTER: ICD-10-CM

## 2019-07-25 DIAGNOSIS — G89.29 CHRONIC LEFT SHOULDER PAIN: Primary | ICD-10-CM

## 2019-07-25 PROCEDURE — 99244 OFF/OP CNSLTJ NEW/EST MOD 40: CPT | Mod: S$PBB,25,, | Performed by: PHYSICIAN ASSISTANT

## 2019-07-25 PROCEDURE — 25500020 PHARM REV CODE 255: Performed by: ANESTHESIOLOGY

## 2019-07-25 PROCEDURE — 77002 PR FLUOROSCOPIC GUIDANCE NEEDLE PLACEMENT: ICD-10-PCS | Mod: 26,,, | Performed by: ANESTHESIOLOGY

## 2019-07-25 PROCEDURE — 77002 NEEDLE LOCALIZATION BY XRAY: CPT | Mod: 26,,, | Performed by: ANESTHESIOLOGY

## 2019-07-25 PROCEDURE — 63600175 PHARM REV CODE 636 W HCPCS: Performed by: ANESTHESIOLOGY

## 2019-07-25 PROCEDURE — 20610 DRAIN/INJ JOINT/BURSA W/O US: CPT | Performed by: ANESTHESIOLOGY

## 2019-07-25 PROCEDURE — 99244 PR OFFICE CONSULTATION,LEVEL IV: ICD-10-PCS | Mod: S$PBB,25,, | Performed by: PHYSICIAN ASSISTANT

## 2019-07-25 PROCEDURE — 99999 PR PBB SHADOW E&M-EST. PATIENT-LVL III: ICD-10-PCS | Mod: PBBFAC,,, | Performed by: PHYSICIAN ASSISTANT

## 2019-07-25 PROCEDURE — 99999 PR PBB SHADOW E&M-EST. PATIENT-LVL III: CPT | Mod: PBBFAC,,, | Performed by: PHYSICIAN ASSISTANT

## 2019-07-25 PROCEDURE — 20610 DRAIN/INJ JOINT/BURSA W/O US: CPT | Mod: LT,,, | Performed by: ANESTHESIOLOGY

## 2019-07-25 PROCEDURE — 20610 PR DRAIN/INJECT LARGE JOINT/BURSA: ICD-10-PCS | Mod: LT,,, | Performed by: ANESTHESIOLOGY

## 2019-07-25 PROCEDURE — 25000003 PHARM REV CODE 250: Performed by: ANESTHESIOLOGY

## 2019-07-25 PROCEDURE — 99213 OFFICE O/P EST LOW 20 MIN: CPT | Mod: PBBFAC,25 | Performed by: PHYSICIAN ASSISTANT

## 2019-07-25 RX ORDER — LIDOCAINE HYDROCHLORIDE 20 MG/ML
INJECTION, SOLUTION EPIDURAL; INFILTRATION; INTRACAUDAL; PERINEURAL
Status: DISCONTINUED | OUTPATIENT
Start: 2019-07-25 | End: 2019-07-25 | Stop reason: HOSPADM

## 2019-07-25 RX ORDER — METHYLPREDNISOLONE ACETATE 40 MG/ML
INJECTION, SUSPENSION INTRA-ARTICULAR; INTRALESIONAL; INTRAMUSCULAR; SOFT TISSUE
Status: DISCONTINUED | OUTPATIENT
Start: 2019-07-25 | End: 2019-07-25 | Stop reason: HOSPADM

## 2019-07-25 NOTE — DISCHARGE INSTRUCTIONS
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
## 2019-07-25 NOTE — DISCHARGE SUMMARY
[facility]  Discharge Note       Description of Procedure: Left Shoulder Injection under Fluoroscopic Guidance    Procedure Date: 7/25/2019    Admit Date: 7/25/2019  Discharge Date: 7/25/2019     Attending Physician: Tobias Patterson   Discharge Provider: Tobias Patterson    Preoperative Diagnosis: Left Shoulder Pain     Postoperative Diagnosis: as above, same as preoperative diagnosis    Discharged Condition: Stable    Hospital Course: Patient was admitted for an outpatient procedure. The procedure was tolerated well with no complications.    Final Diagnoses: Same as principal problem.    Disposition: Home, self-care.    Follow up/Patient Instructions:  Follow-up in clinic in 2-3 weeks.    Medications: No medications were prescribed today. The patient was advised to resume normal medication regimen without change.  Specific information was provided regarding restarting any anticoagulant/s.    Discharge Procedure Orders (must include Diet, Follow-up, Activity):  Light activity for the remainder of the day, resume normal activity tomorrow. Resume normal diet. Follow-up in clinic in 2-3 weeks.

## 2019-07-25 NOTE — OP NOTE
PROCEDURE: Shoulder injection under fluoroscopic guidance     SIDE: Left       PROCEDURE DATE: 7/25/2019     PREOPERATIVE DIAGNOSIS: Shoulder pain  POSTOPERATIVE DIAGNOSIS: same     PROVIDER: Leonardo Collado MD  Assistant(s): None     ANESTHESIA: Local     INDICATION: The patient has a history of pain due to sacroiliitis unresponsive to conservative treatments. Fluoroscopy was used to optimize visualization of needle placement and to maximize safety.         PROCEDURE DESCRIPTION: The patient was seen and identified in the preoperative area. Risks, benefits, complications, and alternatives were discussed with the patient. The patient agreed to proceed with the procedure and signed the consent. The site and side of the procedure was identified and marked. An IV was not started.     The patient was taken to the procedural suite. The patient was positioned in prone orientation on procedure table. A time out was performed prior to any intervention. The procedure, site, side, and allergies were stated and agreed to by all present. The lumbosacral area was widely prepped with ChloraPrep. The procedural site was draped in usual sterile fashion. Vital signs were closely monitored throughout this procedure. Conscious sedation was not used for this procedure.         Shoulder Injection:  The Left  shoulder was prepped and draped in the usual sterile fashion with ChloraPrep. 1% lidocaine through a 27-gauge needle was used to anesthetize the target area, and then a 3-1/2 inch 25-gauge Quincke-point needle advanced under direct vision fluoroscopy toward the Left -sided shoulder joint through the lateral approach, and the placement of the needle was confirmed by injecting 1 mL of iodine contrast under live fluoroscopy, which demonstrated excellent placement of the needle. Aspiration was negative for blood and CSF and air. Subsequently 3 mL solution containing 1 mL of Methylprednisolone (40 mg/mL) and 2 mL 1% PF Lidocaine was  injected, and then the needle was withdrawn. The patient tolerated the procedure very well and was brought to the postprocedure recovery room in excellent condition     Description of Findings: Not applicable     Prosthetic devices, grafts, tissues, or devices implanted: None     Specimen Removed: No     Estimated Blood Loss: minimal     COMPLICATIONS: None     DISPOSITION / PLANS: The patient was transferred to the recovery area in a stable condition for observation. The patient was reexamined prior to discharge. There was no evidence of acute neurologic injury following the procedure.  Patient was discharged from the recovery room after meeting discharge criteria. Home discharge instructions were given to the patient by the staff.

## 2019-09-03 ENCOUNTER — TELEPHONE (OUTPATIENT)
Dept: ORTHOPEDICS | Facility: CLINIC | Age: 54
End: 2019-09-03

## 2019-09-03 NOTE — TELEPHONE ENCOUNTER
Called pt back to move his apt up due to increase pain in shoulder. Pt is now rescheduled to 9/5. Pt understood      ----- Message from Simba Tay sent at 9/3/2019  1:33 PM CDT -----  Contact: Pt  Type:  Sooner Apoointment Request    Caller is requesting a sooner appointment.  Caller declined first available appointment listed below.  Caller will not accept being placed on the waitlist and is requesting a message be sent to doctor.  Name of Caller:Colby Yepez  When is the first available appointment?10/04/2019  Symptoms:Shoulder pains  Would the patient rather a call back or a response via [facility]? Call Back  Best Call Back Number:[phone] (home)   Additional Information:

## 2019-09-05 ENCOUNTER — OFFICE VISIT (OUTPATIENT)
Dept: ORTHOPEDICS | Facility: CLINIC | Age: 54
End: 2019-09-05
Payer: OTHER GOVERNMENT

## 2019-09-05 VITALS
HEART RATE: 70 BPM | BODY MASS INDEX: 30.16 KG/M2 | SYSTOLIC BLOOD PRESSURE: 108 MMHG | DIASTOLIC BLOOD PRESSURE: 67 MMHG | WEIGHT: 235 LBS | HEIGHT: 74 IN

## 2019-09-05 DIAGNOSIS — M67.814 TENDINOSIS OF LEFT SHOULDER: ICD-10-CM

## 2019-09-05 DIAGNOSIS — M75.42 IMPINGEMENT SYNDROME OF LEFT SHOULDER: ICD-10-CM

## 2019-09-05 DIAGNOSIS — M75.82 TENDONITIS OF LEFT ROTATOR CUFF: ICD-10-CM

## 2019-09-05 DIAGNOSIS — G89.29 CHRONIC LEFT SHOULDER PAIN: Primary | ICD-10-CM

## 2019-09-05 DIAGNOSIS — S43.432A SUPERIOR GLENOID LABRUM LESION OF LEFT SHOULDER, INITIAL ENCOUNTER: ICD-10-CM

## 2019-09-05 DIAGNOSIS — M25.512 CHRONIC LEFT SHOULDER PAIN: Primary | ICD-10-CM

## 2019-09-05 DIAGNOSIS — M75.22 TENDONITIS OF UPPER BICEPS TENDON OF LEFT SHOULDER: ICD-10-CM

## 2019-09-05 PROCEDURE — 99999 PR PBB SHADOW E&M-EST. PATIENT-LVL III: ICD-10-PCS | Mod: PBBFAC,,, | Performed by: ORTHOPAEDIC SURGERY

## 2019-09-05 PROCEDURE — 99213 OFFICE O/P EST LOW 20 MIN: CPT | Mod: PBBFAC | Performed by: ORTHOPAEDIC SURGERY

## 2019-09-05 PROCEDURE — 99999 PR PBB SHADOW E&M-EST. PATIENT-LVL III: CPT | Mod: PBBFAC,,, | Performed by: ORTHOPAEDIC SURGERY

## 2019-09-05 PROCEDURE — 99214 PR OFFICE/OUTPT VISIT, EST, LEVL IV, 30-39 MIN: ICD-10-PCS | Mod: S$PBB,,, | Performed by: ORTHOPAEDIC SURGERY

## 2019-09-05 PROCEDURE — 99214 OFFICE O/P EST MOD 30 MIN: CPT | Mod: S$PBB,,, | Performed by: ORTHOPAEDIC SURGERY

## 2019-09-05 NOTE — LETTER
September 5, 2019      [facility]  [address]  [place] [address]  Phone: [phone]  Fax: [phone]       Patient: Colby Yepez   YOB: 1965  Date of Visit: 09/05/2019    To Whom It May Concern:    Elfego Yepez  was at [facility] on 09/05/2019. He may return to work on 9/5/19. If you have any questions or concerns, or if I can be of further assistance, please do not hesitate to contact me.    Sincerely,  The office of MD Flavia Chao LPN
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
## 2019-09-16 ENCOUNTER — TELEPHONE (OUTPATIENT)
Dept: ORTHOPEDICS | Facility: CLINIC | Age: 54
End: 2019-09-16

## 2019-09-16 NOTE — TELEPHONE ENCOUNTER
Called pt back, he informed me that he is on his way to drop office his paperwork. I informed him to let the  to ask for Rianna in Dr. Patton office. Pt understood.         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

## 2019-09-26 ENCOUNTER — TELEPHONE (OUTPATIENT)
Dept: ORTHOPEDICS | Facility: CLINIC | Age: 54
End: 2019-09-26

## 2019-09-26 NOTE — TELEPHONE ENCOUNTER
Called pt to let him know we are still working on his paperwork and he will be informed when it is ready. Pt understood.         ----- Message from Romy Smith sent at 9/26/2019 10:54 AM CDT -----  Patient needs call back paperwork that needs to be filled out..[phone]

## 2019-09-27 ENCOUNTER — PATIENT MESSAGE (OUTPATIENT)
Dept: ORTHOPEDICS | Facility: CLINIC | Age: 54
End: 2019-09-27

## 2019-09-30 ENCOUNTER — TELEPHONE (OUTPATIENT)
Dept: ORTHOPEDICS | Facility: CLINIC | Age: 54
End: 2019-09-30

## 2019-09-30 NOTE — TELEPHONE ENCOUNTER
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 Pt can be reached at [phone] (home)

## 2019-10-14 ENCOUNTER — TELEPHONE (OUTPATIENT)
Dept: SPORTS MEDICINE | Facility: CLINIC | Age: 54
End: 2019-10-14

## 2019-10-14 NOTE — TELEPHONE ENCOUNTER
Spoke with pt who would like to move surgery to 12/9. preop appts and post op appt has been changed as well. Pt verbalized understanding.

## 2019-10-14 NOTE — TELEPHONE ENCOUNTER
----- Message from Elena Villalobos RN sent at 10/14/2019  8:14 AM CDT -----   Good Morning, I spoke with this patient this morning he wants to reschedule his surgery. Please call him. Thanks

## 2019-11-18 ENCOUNTER — HOSPITAL ENCOUNTER (OUTPATIENT)
Dept: PREADMISSION TESTING | Facility: HOSPITAL | Age: 54
Discharge: HOME | End: 2019-11-18
Attending: ORTHOPAEDIC SURGERY
Payer: OTHER GOVERNMENT

## 2019-11-18 VITALS
RESPIRATION RATE: 15 BRPM | TEMPERATURE: 98 F | SYSTOLIC BLOOD PRESSURE: 119 MMHG | DIASTOLIC BLOOD PRESSURE: 74 MMHG | HEART RATE: 78 BPM | OXYGEN SATURATION: 96 %

## 2019-11-18 DIAGNOSIS — Z01.818 PREOP TESTING: Primary | ICD-10-CM

## 2019-11-18 DIAGNOSIS — M19.012 PRIMARY OSTEOARTHRITIS OF LEFT SHOULDER: ICD-10-CM

## 2019-11-18 PROBLEM — I95.9 HYPOTENSION: Status: ACTIVE | Noted: 2019-11-18

## 2019-11-18 PROBLEM — T78.40XA SENSITIVITY TO MEDICATION: Status: ACTIVE | Noted: 2019-11-18

## 2019-11-18 LAB
ALBUMIN SERPL BCP-MCNC: 4 G/DL (ref 3.5–5.2)
ALP SERPL-CCNC: 68 U/L (ref 55–135)
ALT SERPL W/O P-5'-P-CCNC: 15 U/L (ref 10–44)
ANION GAP SERPL CALC-SCNC: 7 MMOL/L (ref 8–16)
AST SERPL-CCNC: 16 U/L (ref 10–40)
BASOPHILS # BLD AUTO: 0.04 K/UL (ref 0–0.2)
BASOPHILS NFR BLD: 0.6 % (ref 0–1.9)
BILIRUB SERPL-MCNC: 0.5 MG/DL (ref 0.1–1)
BUN SERPL-MCNC: 20 MG/DL (ref 6–20)
CALCIUM SERPL-MCNC: 9.7 MG/DL (ref 8.7–10.5)
CHLORIDE SERPL-SCNC: 107 MMOL/L (ref 95–110)
CO2 SERPL-SCNC: 27 MMOL/L (ref 23–29)
CREAT SERPL-MCNC: 1 MG/DL (ref 0.5–1.4)
DIFFERENTIAL METHOD: NORMAL
EOSINOPHIL # BLD AUTO: 0.3 K/UL (ref 0–0.5)
EOSINOPHIL NFR BLD: 4.5 % (ref 0–8)
ERYTHROCYTE [DISTWIDTH] IN BLOOD BY AUTOMATED COUNT: 12.9 % (ref 11.5–14.5)
EST. GFR  (AFRICAN AMERICAN): >60 ML/MIN/1.73 M^2
EST. GFR  (NON AFRICAN AMERICAN): >60 ML/MIN/1.73 M^2
GLUCOSE SERPL-MCNC: 96 MG/DL (ref 70–110)
HCT VFR BLD AUTO: 50.9 % (ref 40–54)
HGB BLD-MCNC: 16.8 G/DL (ref 14–18)
IMM GRANULOCYTES # BLD AUTO: 0.02 K/UL (ref 0–0.04)
IMM GRANULOCYTES NFR BLD AUTO: 0.3 % (ref 0–0.5)
LYMPHOCYTES # BLD AUTO: 2.5 K/UL (ref 1–4.8)
LYMPHOCYTES NFR BLD: 36.8 % (ref 18–48)
MCH RBC QN AUTO: 29.9 PG (ref 27–31)
MCHC RBC AUTO-ENTMCNC: 33 G/DL (ref 32–36)
MCV RBC AUTO: 91 FL (ref 82–98)
MONOCYTES # BLD AUTO: 0.5 K/UL (ref 0.3–1)
MONOCYTES NFR BLD: 8 % (ref 4–15)
NEUTROPHILS # BLD AUTO: 3.3 K/UL (ref 1.8–7.7)
NEUTROPHILS NFR BLD: 50.1 % (ref 38–73)
NRBC BLD-RTO: 0 /100 WBC
PLATELET # BLD AUTO: 165 K/UL (ref 150–350)
PMV BLD AUTO: 9.8 FL (ref 9.2–12.9)
POTASSIUM SERPL-SCNC: 4.7 MMOL/L (ref 3.5–5.1)
PROT SERPL-MCNC: 6.6 G/DL (ref 6–8.4)
RBC # BLD AUTO: 5.62 M/UL (ref 4.6–6.2)
SODIUM SERPL-SCNC: 141 MMOL/L (ref 136–145)
WBC # BLD AUTO: 6.66 K/UL (ref 3.9–12.7)

## 2019-11-18 PROCEDURE — 93010 EKG 12-LEAD: ICD-10-PCS | Mod: ,,, | Performed by: NUCLEAR MEDICINE

## 2019-11-18 PROCEDURE — 93005 ELECTROCARDIOGRAM TRACING: CPT

## 2019-11-18 PROCEDURE — 93010 ELECTROCARDIOGRAM REPORT: CPT | Mod: ,,, | Performed by: NUCLEAR MEDICINE

## 2019-11-18 PROCEDURE — 85025 COMPLETE CBC W/AUTO DIFF WBC: CPT

## 2019-11-18 PROCEDURE — 80053 COMPREHEN METABOLIC PANEL: CPT

## 2019-11-18 RX ORDER — FAMOTIDINE 20 MG/1
20 TABLET, FILM COATED ORAL
Status: CANCELLED | OUTPATIENT
Start: 2019-11-18 | End: 2019-11-18

## 2019-11-18 RX ORDER — ACETAMINOPHEN 500 MG
1000 TABLET ORAL
Status: CANCELLED | OUTPATIENT
Start: 2019-11-18 | End: 2019-11-18

## 2019-11-18 RX ORDER — SODIUM CHLORIDE, SODIUM LACTATE, POTASSIUM CHLORIDE, CALCIUM CHLORIDE 600; 310; 30; 20 MG/100ML; MG/100ML; MG/100ML; MG/100ML
INJECTION, SOLUTION INTRAVENOUS
Status: CANCELLED | OUTPATIENT
Start: 2019-11-18 | End: 2019-11-18

## 2019-11-18 RX ORDER — CELECOXIB 100 MG/1
400 CAPSULE ORAL
Status: CANCELLED | OUTPATIENT
Start: 2019-11-18 | End: 2019-11-18

## 2019-11-18 NOTE — ASSESSMENT & PLAN NOTE
The patient has left shoulder pain, left shoulder impingement syndrome and tendonitis and will have a left shoulder arthroscopy with decompression and possible rotator cuff repair on 12/9/19 by Dr. Patton    Known risk factors for perioperative complications:     Hypotension   Sensitivity to sedating medications -?respiratory arrest to Valium and Percocet    Difficulty with intubation is not anticipated.    Cardiac Risk Estimation: low intraoperative cardiac risk     1.) Preoperative workup as follows: ECG, hemoglobin, hematocrit, electrolytes, creatinine, glucose.  2.) Change in medication regimen before surgery:  discontinue NSAIDs (Ibuprofen and Mobic) 5 days before surgery  3.) Prophylaxis for cardiac events with perioperative beta-blockers: not indicated.  4.) Invasive hemodynamic monitoring perioperatively: not indicated.  5.) Deep vein thrombosis prophylaxis postoperatively: regimen to be chosen by surgical team.  6.) Surveillance for postoperative MI with ECG immediately postoperatively and on postoperati ve days 1 and 2 AND troponin levels 24 hours postoperatively and on day 4 or hospital discharge (whichever comes first): not indicated.  7.) Current medications which may produce withdrawal symptoms if withheld perioperatively: None   8.) Other measures: none

## 2019-11-18 NOTE — H&P (VIEW-ONLY)
Preoperative History and Physical                                                             Hospital Medicine      Chief Complaint: Preoperative evaluation     History of Present Illness:      Colby Yepez is a 54 y.o. male with baseline low BP and left shoulder impingement who presents to the office today for a preoperative consultation at the request of Dr. Patton who plans on performing left shoulder arthroscopy with decompression on December 9.     Functional Status:      The patient is able to climb a flight of stairs. The patient is able to ambulate over 3 blocks without difficulty. The patient's functional status is not affected by the surgical problem. The patient's functional status is not affected by shortness of breath, chest pain, dyspnea on exertion and fatigue.    MET score greater than 4    Past Medical History:      Past Medical History:   Diagnosis Date    Allergy     Hypotension, iatrogenic     Kidney stone     Liver cyst         Past Surgical History:      Past Surgical History:   Procedure Laterality Date    COLONOSCOPY N/A 5/13/2019    Procedure: COLONOSCOPY;  Surgeon: Soha Norton MD;  [facility];  Service: Endoscopy;  Laterality: N/A;    GANGLION CYST EXCISION Left     INJECTION OF JOINT Left 7/25/2019    Procedure: Left shoulder Joint injection with local;  Surgeon: Leonardo Collado MD;  Location: [facility];  Service: Pain Management;  Laterality: Left;    SINUS SURGERY      TONSILLECTOMY, ADENOIDECTOMY, BILATERAL MYRINGOTOMY AND TUBES      VASECTOMY  10/27/2017        Social History:      Social History     Socioeconomic History    Marital status:      Spouse name: Not on file    Number of children: 3    Years of education: Not on file    Highest education level: Not on file   Occupational History     Employer:  [employer]   Social Needs    Financial resource strain: Not on file    Food  insecurity:     Worry: Not on file     Inability: Not on file    Transportation needs:     Medical: Not on file     Non-medical: Not on file   Tobacco Use    Smoking status: Never Smoker    Smokeless tobacco: Never Used   Substance and Sexual Activity    Alcohol use: Yes     Frequency: 2-4 times a month     Drinks per session: 1 or 2     Binge frequency: Never     Comment: monthly    Drug use: No    Sexual activity: Not on file   Lifestyle    Physical activity:     Days per week: Not on file     Minutes per session: Not on file    Stress: Not on file   Relationships    Social connections:     Talks on phone: Not on file     Gets together: Not on file     Attends Mormonism service: Not on file     Active member of club or organization: Not on file     Attends meetings of clubs or organizations: Not on file     Relationship status: Not on file   Other Topics Concern    Not on file   Social History Narrative    Not on file        Family History:      Family History   Problem Relation Age of Onset    Hypertension Mother     Prostate cancer Father     Hyperlipidemia Father        Allergies:      Review of patient's allergies indicates:   Allergen Reactions    Tramadol Other (See Comments)     dizziness       Medications:      Current Outpatient Medications   Medication Sig    ibuprofen (ADVIL,MOTRIN) 800 MG tablet TAKE ONE TABLET BY MOUTH THREE TIMES DAILY AS NEEDED FOR PAIN (Patient taking differently: Take 400 mg by mouth every 6 (six) hours as needed. Hold 5 days prior to surgery)    multivitamin (ONE DAILY MULTIVITAMIN) per tablet Take 1 tablet by mouth once daily. Hold 5 days prior to surgery    tiZANidine (ZANAFLEX) 4 MG tablet Take 1 tablet (4 mg total) by mouth 3 (three) times daily as needed.     No current facility-administered medications for this encounter.        Vitals:      Vitals:    11/18/19 0853   BP: 119/74   Pulse: 78   Resp: 15   Temp: 98.4 °F (36.9 °C)       Review of Systems:         Constitutional: Negative for fever, chills, weight loss, malaise/fatigue and diaphoresis.   HENT: Negative for hearing loss, ear pain, nosebleeds, congestion, sore throat, neck pain, tinnitus and ear discharge.    Eyes: Negative for blurred vision, double vision, photophobia, pain, discharge and redness.   Respiratory: Negative for cough, hemoptysis, sputum production, shortness of breath, wheezing and stridor.    Cardiovascular: Negative for chest pain, palpitations, orthopnea, claudication, leg swelling and PND.   Gastrointestinal: Negative for heartburn, nausea, vomiting, abdominal pain, diarrhea, constipation, blood in stool and melena.   Genitourinary: Negative for dysuria, urgency, frequency, hematuria and flank pain.   Musculoskeletal: +left shoulder pain, Intermittent lower back pain Negative for myalgias,joint pain and falls.   Skin: Negative for itching and rash.   Neurological: Negative for dizziness, tingling, tremors, sensory change, speech change, focal weakness, seizures, loss of consciousness, weakness and headaches.   Endo/Heme/Allergies: Negative for environmental allergies and polydipsia. Does not bruise/bleed easily.   Psychiatric/Behavioral: Negative for depression, suicidal ideas, hallucinations, memory loss and substance abuse. The patient is not nervous/anxious and does not have insomnia.    All 14 systems reviewed and negative except as noted above.    Physical Exam:      Constitutional: Appears well-developed, well-nourished and in no acute distress.  Patient is oriented to person, place, and time.   Head: Normocephalic and atraumatic. Mucous membranes moist.  Neck: Neck supple no mass.   Cardiovascular: Normal rate and regular rhythm.  S1 S2 appreciated by ascultation.  Pulmonary/Chest: Effort normal and clear to auscultation bilaterally. No respiratory distress.   Abdomen: Soft. Non-tender and non-distended. Bowel sounds are normal.   Neurological: Patient is alert and oriented to  person, place and time. Moves all extremities.  Skin: Warm and dry. No lesions.  Extremities: No clubbing, cyanosis or edema.    Laboratory data:      Reviewed and noted in plan where applicable. Please see chart for full laboratory data.    No results for input(s): CPK, CPKMB, TROPONINI, MB in the last 24 hours. No results for input(s): POCTGLUCOSE in the last 24 hours.     Lab Results   Component Value Date    INR 1.2 2010       Lab Results   Component Value Date    WBC 6.7 2019    HGB 16.0 2019    HCT 47.9 2019    MCV 94 2019     (L) 2019       No results for input(s): GLU, NA, K, CL, CO2, BUN, CREATININE, CALCIUM, MG in the last 24 hours.    Predictors of intubation difficulty:       Morbid obesity? no   Anatomically abnormal facies? no   Prominent incisors? no   Receding mandible? no   Short, thick neck? no   Neck range of motion: normal      Cardiographics:      EC19  NSR-no ST or T wave abnormalities     Imaging:      Chest x-ray: not required   Assessment and Plan:      Primary osteoarthritis of left shoulder  The patient has left shoulder pain, left shoulder impingement syndrome and tendonitis and will have a left shoulder arthroscopy with decompression and possible rotator cuff repair on 19 by Dr. Patton    Known risk factors for perioperative complications:     Hypotension   Sensitivity to sedating medications -?respiratory arrest after taking Valium and Percocet for vasectomy     Difficulty with intubation is not anticipated.    Cardiac Risk Estimation: low intraoperative cardiac risk     1.) Preoperative workup as follows: ECG, hemoglobin, hematocrit, electrolytes, creatinine, glucose.  2.) Change in medication regimen before surgery:  discontinue NSAIDs (Ibuprofen and Mobic) 5 days before surgery  3.) Prophylaxis for cardiac events with perioperative beta-blockers: not indicated.  4.) Invasive hemodynamic monitoring perioperatively: not  "indicated.  5.) Deep vein thrombosis prophylaxis postoperatively: regimen to be chosen by surgical team.  6.) Surveillance for postoperative MI with ECG immediately postoperatively and on postoperati ve days 1 and 2 AND troponin levels 24 hours postoperatively and on day 4 or hospital discharge (whichever comes first): not indicated.  7.) Current medications which may produce withdrawal symptoms if withheld perioperatively: None   8.) Other measures: none     Hypotension  The patient reports baseline low BP and baseline orthostatic hypotension   BP stable     Sensitivity to medication  The patient reports severe hypotension and "respiratory arrest" after being given Percocet and Valium for vasectomy   Pt denies any problems with anesthesia with other surgeries/procedures     "

## 2019-11-18 NOTE — ASSESSMENT & PLAN NOTE
"The patient reports severe hypotension and "respiratory arrest" after being given Percocet and Valium for Vasectomy   Pt denies any problems with anesthesia with other surgeries/procedures   "

## 2019-11-18 NOTE — H&P
Preoperative History and Physical                                                             Hospital Medicine      Chief Complaint: Preoperative evaluation     History of Present Illness:      Colby Yepez is a 54 y.o. male with baseline low BP and left shoulder impingement who presents to the office today for a preoperative consultation at the request of Dr. Patton who plans on performing left shoulder arthroscopy with decompression on December 9.     Functional Status:      The patient is able to climb a flight of stairs. The patient is able to ambulate over 3 blocks without difficulty. The patient's functional status is not affected by the surgical problem. The patient's functional status is not affected by shortness of breath, chest pain, dyspnea on exertion and fatigue.    MET score greater than 4    Past Medical History:      Past Medical History:   Diagnosis Date    Allergy     Hypotension, iatrogenic     Kidney stone     Liver cyst         Past Surgical History:      Past Surgical History:   Procedure Laterality Date    COLONOSCOPY N/A 5/13/2019    Procedure: COLONOSCOPY;  Surgeon: Soha Norton MD;  Location: [facility];  Service: Endoscopy;  Laterality: N/A;    GANGLION CYST EXCISION Left     INJECTION OF JOINT Left 7/25/2019    Procedure: Left shoulder Joint injection with local;  Surgeon: Leonardo Collado MD;  Location: [facility];  Service: Pain Management;  Laterality: Left;    SINUS SURGERY      TONSILLECTOMY, ADENOIDECTOMY, BILATERAL MYRINGOTOMY AND TUBES      VASECTOMY  10/27/2017        Social History:      Social History     Socioeconomic History    Marital status:      Spouse name: Not on file    Number of children: 3    Years of education: Not on file    Highest education level: Not on file   Occupational History     Employer:  [employer]   Social Needs    Financial resource strain: Not on file    Food  insecurity:     Worry: Not on file     Inability: Not on file    Transportation needs:     Medical: Not on file     Non-medical: Not on file   Tobacco Use    Smoking status: Never Smoker    Smokeless tobacco: Never Used   Substance and Sexual Activity    Alcohol use: Yes     Frequency: 2-4 times a month     Drinks per session: 1 or 2     Binge frequency: Never     Comment: monthly    Drug use: No    Sexual activity: Not on file   Lifestyle    Physical activity:     Days per week: Not on file     Minutes per session: Not on file    Stress: Not on file   Relationships    Social connections:     Talks on phone: Not on file     Gets together: Not on file     Attends [employer] service: Not on file     Active member of club or organization: Not on file     Attends meetings of clubs or organizations: Not on file     Relationship status: Not on file   Other Topics Concern    Not on file   Social History Narrative    Not on file        Family History:      Family History   Problem Relation Age of Onset    Hypertension Mother     Prostate cancer Father     Hyperlipidemia Father        Allergies:      Review of patient's allergies indicates:   Allergen Reactions    Tramadol Other (See Comments)     dizziness       Medications:      Current Outpatient Medications   Medication Sig    ibuprofen (ADVIL,MOTRIN) 800 MG tablet TAKE ONE TABLET BY MOUTH THREE TIMES DAILY AS NEEDED FOR PAIN (Patient taking differently: Take 400 mg by mouth every 6 (six) hours as needed. Hold 5 days prior to surgery)    multivitamin (ONE DAILY MULTIVITAMIN) per tablet Take 1 tablet by mouth once daily. Hold 5 days prior to surgery    tiZANidine (ZANAFLEX) 4 MG tablet Take 1 tablet (4 mg total) by mouth 3 (three) times daily as needed.     No current facility-administered medications for this encounter.        Vitals:      Vitals:    11/18/19 0853   BP: 119/74   Pulse: 78   Resp: 15   Temp: 98.4 °F (36.9 °C)       Review of Systems:         Constitutional: Negative for fever, chills, weight loss, malaise/fatigue and diaphoresis.   HENT: Negative for hearing loss, ear pain, nosebleeds, congestion, sore throat, neck pain, tinnitus and ear discharge.    Eyes: Negative for blurred vision, double vision, photophobia, pain, discharge and redness.   Respiratory: Negative for cough, hemoptysis, sputum production, shortness of breath, wheezing and stridor.    Cardiovascular: Negative for chest pain, palpitations, orthopnea, claudication, leg swelling and PND.   Gastrointestinal: Negative for heartburn, nausea, vomiting, abdominal pain, diarrhea, constipation, blood in stool and melena.   Genitourinary: Negative for dysuria, urgency, frequency, hematuria and flank pain.   Musculoskeletal: +left shoulder pain, Intermittent lower back pain Negative for myalgias,joint pain and falls.   Skin: Negative for itching and rash.   Neurological: Negative for dizziness, tingling, tremors, sensory change, speech change, focal weakness, seizures, loss of consciousness, weakness and headaches.   Endo/Heme/Allergies: Negative for environmental allergies and polydipsia. Does not bruise/bleed easily.   Psychiatric/Behavioral: Negative for depression, suicidal ideas, hallucinations, memory loss and substance abuse. The patient is not nervous/anxious and does not have insomnia.    All 14 systems reviewed and negative except as noted above.    Physical Exam:      Constitutional: Appears well-developed, well-nourished and in no acute distress.  Patient is oriented to person, place, and time.   Head: Normocephalic and atraumatic. Mucous membranes moist.  Neck: Neck supple no mass.   Cardiovascular: Normal rate and regular rhythm.  S1 S2 appreciated by ascultation.  Pulmonary/Chest: Effort normal and clear to auscultation bilaterally. No respiratory distress.   Abdomen: Soft. Non-tender and non-distended. Bowel sounds are normal.   Neurological: Patient is alert and oriented to  person, place and time. Moves all extremities.  Skin: Warm and dry. No lesions.  Extremities: No clubbing, cyanosis or edema.    Laboratory data:      Reviewed and noted in plan where applicable. Please see chart for full laboratory data.    No results for input(s): CPK, CPKMB, TROPONINI, MB in the last 24 hours. No results for input(s): POCTGLUCOSE in the last 24 hours.     Lab Results   Component Value Date    INR 1.2 2010       Lab Results   Component Value Date    WBC 6.7 2019    HGB 16.0 2019    HCT 47.9 2019    MCV 94 2019     (L) 2019       No results for input(s): GLU, NA, K, CL, CO2, BUN, CREATININE, CALCIUM, MG in the last 24 hours.    Predictors of intubation difficulty:       Morbid obesity? no   Anatomically abnormal facies? no   Prominent incisors? no   Receding mandible? no   Short, thick neck? no   Neck range of motion: normal      Cardiographics:      EC19  NSR-no ST or T wave abnormalities     Imaging:      Chest x-ray: not required   Assessment and Plan:      Primary osteoarthritis of left shoulder  The patient has left shoulder pain, left shoulder impingement syndrome and tendonitis and will have a left shoulder arthroscopy with decompression and possible rotator cuff repair on 19 by Dr. Patton    Known risk factors for perioperative complications:     Hypotension   Sensitivity to sedating medications -?respiratory arrest after taking Valium and Percocet for vasectomy     Difficulty with intubation is not anticipated.    Cardiac Risk Estimation: low intraoperative cardiac risk     1.) Preoperative workup as follows: ECG, hemoglobin, hematocrit, electrolytes, creatinine, glucose.  2.) Change in medication regimen before surgery:  discontinue NSAIDs (Ibuprofen and Mobic) 5 days before surgery  3.) Prophylaxis for cardiac events with perioperative beta-blockers: not indicated.  4.) Invasive hemodynamic monitoring perioperatively: not  "indicated.  5.) Deep vein thrombosis prophylaxis postoperatively: regimen to be chosen by surgical team.  6.) Surveillance for postoperative MI with ECG immediately postoperatively and on postoperati ve days 1 and 2 AND troponin levels 24 hours postoperatively and on day 4 or hospital discharge (whichever comes first): not indicated.  7.) Current medications which may produce withdrawal symptoms if withheld perioperatively: None   8.) Other measures: none     Hypotension  The patient reports baseline low BP and baseline orthostatic hypotension   BP stable     Sensitivity to medication  The patient reports severe hypotension and "respiratory arrest" after being given Percocet and Valium for vasectomy   Pt denies any problems with anesthesia with other surgeries/procedures     "

## 2019-11-18 NOTE — DISCHARGE INSTRUCTIONS
To confirm, Your doctor has instructed you that surgery is scheduled for 12/9/19.       Please report to [facility] by.  Pre admit office will call afternoon prior to surgery with final arrival time    INSTRUCTIONS IMPORTANT!!!   Do not eat, drink, or smoke after 12 midnight-including water. OK to brush teeth, no gum, candy or mints!    ¨ Take only these medicines with a small swallow of water-morning of surgery.  N/A    Pre operative instructions:  Please review the Pre-Operative Instruction booklet that you were given.        Bathing Instructions--See page 6 in the Pre-operative booklet.      Prevention of surgical site infections:     -Keep incisions clean and dry.   -Do not soak/submerge incisions in water until completely healed.   -Do not apply lotions, powders, creams, or deodorants to site.   -Always make sure hands are cleaned with antibacterial soap/ alcohol-based  prior to touching the surgical site.  (This includes doctors,  nurses, staff, and yourself.)    Signs and symptoms:   -Redness and pain around the area where you had surgery   -Drainage of cloudy fluid from your surgical wound   -Fever over 100.4       I have read or had read and explained to me, and understand the above information.  Additional comments or instructions:  Received a copy of Pre-operative instructions booklet, FAQ surgical site infection sheet, and packets of hibiclens (if indicated).

## 2019-12-05 ENCOUNTER — ANESTHESIA EVENT (OUTPATIENT)
Dept: SURGERY | Facility: HOSPITAL | Age: 54
End: 2019-12-05
Payer: OTHER GOVERNMENT

## 2019-12-05 PROBLEM — E66.9 OBESITY, CLASS I, BMI 30-34.9: Status: ACTIVE | Noted: 2019-03-18

## 2019-12-05 PROBLEM — Z12.11 COLON CANCER SCREENING: Status: RESOLVED | Noted: 2019-05-13 | Resolved: 2019-12-05

## 2019-12-05 NOTE — ANESTHESIA PREPROCEDURE EVALUATION
12/05/2019  Colby Yepez is a 54 y.o., male.    Anesthesia Evaluation    I have reviewed the Patient Summary Reports.    I have reviewed the Nursing Notes.   I have reviewed the Medications.     Review of Systems  Anesthesia Hx:  History of prior surgery of interest to airway management or planning: Previous anesthesia: General 5/13/2019 - colonoscopy with general anesthesia.   Denies Personal Hx of Anesthesia complications.   Social:  Non-Smoker, Social Alcohol Use    Cardiovascular:   ECG has been reviewed.    Pulmonary:   Denies Recent URI.    Renal/:   renal calculi    Hepatic/GI:   Denies GERD.    Musculoskeletal:   Arthritis     Endocrine:   Hypothyroidism        Physical Exam  General:  Obesity    Airway/Jaw/Neck:  Airway Findings: Mouth Opening: Normal Tongue: Normal  General Airway Assessment: Adult  Mallampati: III  TM Distance: 4 - 6 cm  Jaw/Neck Findings:  Neck ROM: Normal ROM      Dental:  Dental Findings: In tact, Periodontal disease, Mild        Mental Status:  Mental Status Findings:  Cooperative, Alert and Oriented         Anesthesia Plan  Type of Anesthesia, risks & benefits discussed:  Anesthesia Type:  general  Patient's Preference:   Intra-op Monitoring Plan: standard ASA monitors  Intra-op Monitoring Plan Comments:   Post Op Pain Control Plan: peripheral nerve block, multimodal analgesia and per primary service following discharge from PACU  Post Op Pain Control Plan Comments:   Induction:   IV  Beta Blocker:  Patient is not currently on a Beta-Blocker (No further documentation required).       Informed Consent: Patient understands risks and agrees with Anesthesia plan.  Questions answered. Anesthesia consent signed with patient.  ASA Score: 2     Day of Surgery Review of History & Physical:    H&P update referred to the surgeon.         Ready For Surgery From Anesthesia  Perspective.

## 2019-12-06 ENCOUNTER — TELEPHONE (OUTPATIENT)
Dept: SPORTS MEDICINE | Facility: CLINIC | Age: 54
End: 2019-12-06

## 2019-12-06 NOTE — TELEPHONE ENCOUNTER
----- Message from Chris Salinas sent at 12/6/2019  8:13 AM CST -----  Contact: self  Requesting call back regarding questions about pt procedure. Please call back at [phone].    Thanks,  Chris Salinas

## 2019-12-06 NOTE — TELEPHONE ENCOUNTER
----- Message from Taylor Smith sent at 12/6/2019  8:37 AM CST -----  Contact: Pt   Pt is calling the staff regarding questions about the pt procedure on Monday    Pt call back [phone]    Thanks

## 2019-12-06 NOTE — TELEPHONE ENCOUNTER
Pt asked about arrival time. Informed him that pre-admit will call him later today but as of right now arrival time 5:30am, start time 7am. Pt verbalized understanding.

## 2019-12-09 ENCOUNTER — HOSPITAL ENCOUNTER (OUTPATIENT)
Facility: HOSPITAL | Age: 54
Discharge: HOME | End: 2019-12-09
Attending: ORTHOPAEDIC SURGERY | Admitting: ORTHOPAEDIC SURGERY
Payer: OTHER GOVERNMENT

## 2019-12-09 ENCOUNTER — ANESTHESIA (OUTPATIENT)
Dept: SURGERY | Facility: HOSPITAL | Age: 54
End: 2019-12-09
Payer: OTHER GOVERNMENT

## 2019-12-09 VITALS
SYSTOLIC BLOOD PRESSURE: 121 MMHG | OXYGEN SATURATION: 100 % | HEART RATE: 61 BPM | RESPIRATION RATE: 18 BRPM | WEIGHT: 237.19 LBS | HEIGHT: 74 IN | BODY MASS INDEX: 30.44 KG/M2 | TEMPERATURE: 98 F | DIASTOLIC BLOOD PRESSURE: 78 MMHG

## 2019-12-09 DIAGNOSIS — M25.512 LEFT SHOULDER PAIN: ICD-10-CM

## 2019-12-09 DIAGNOSIS — M25.512 LEFT SHOULDER PAIN, UNSPECIFIED CHRONICITY: Primary | ICD-10-CM

## 2019-12-09 PROCEDURE — 25000003 PHARM REV CODE 250: Performed by: NURSE ANESTHETIST, CERTIFIED REGISTERED

## 2019-12-09 PROCEDURE — 25000003 PHARM REV CODE 250: Performed by: NURSE PRACTITIONER

## 2019-12-09 PROCEDURE — 25000003 PHARM REV CODE 250: Performed by: ANESTHESIOLOGY

## 2019-12-09 PROCEDURE — 63600175 PHARM REV CODE 636 W HCPCS: Performed by: NURSE ANESTHETIST, CERTIFIED REGISTERED

## 2019-12-09 PROCEDURE — 63600175 PHARM REV CODE 636 W HCPCS: Performed by: ORTHOPAEDIC SURGERY

## 2019-12-09 PROCEDURE — 01630 ANES OPN/ARTHR PX SHO JT NOS: CPT | Performed by: ORTHOPAEDIC SURGERY

## 2019-12-09 PROCEDURE — 64415 NJX AA&/STRD BRCH PLXS IMG: CPT | Performed by: ANESTHESIOLOGY

## 2019-12-09 PROCEDURE — 63600175 PHARM REV CODE 636 W HCPCS: Performed by: ANESTHESIOLOGY

## 2019-12-09 PROCEDURE — 71000033 HC RECOVERY, INTIAL HOUR: Performed by: ORTHOPAEDIC SURGERY

## 2019-12-09 PROCEDURE — 37000008 HC ANESTHESIA 1ST 15 MINUTES: Performed by: ORTHOPAEDIC SURGERY

## 2019-12-09 PROCEDURE — 64415 PR NERVE BLOCK INJ, ANES/STEROID, BRACHIAL PLEXUS, INCL IMAG GUIDANCE: ICD-10-PCS | Mod: 59,LT,, | Performed by: ANESTHESIOLOGY

## 2019-12-09 PROCEDURE — 27201423 OPTIME MED/SURG SUP & DEVICES STERILE SUPPLY: Performed by: ORTHOPAEDIC SURGERY

## 2019-12-09 PROCEDURE — 29823 PR SHLDR ARTHROSCOP,EXTEN DEBRIDE: ICD-10-PCS | Mod: 51,LT,, | Performed by: ORTHOPAEDIC SURGERY

## 2019-12-09 PROCEDURE — 76942 ECHO GUIDE FOR BIOPSY: CPT | Mod: 26,,, | Performed by: ANESTHESIOLOGY

## 2019-12-09 PROCEDURE — 71000039 HC RECOVERY, EACH ADD'L HOUR: Performed by: ORTHOPAEDIC SURGERY

## 2019-12-09 PROCEDURE — 64415 NJX AA&/STRD BRCH PLXS IMG: CPT | Mod: 59,LT,, | Performed by: ANESTHESIOLOGY

## 2019-12-09 PROCEDURE — 25000003 PHARM REV CODE 250: Performed by: ORTHOPAEDIC SURGERY

## 2019-12-09 PROCEDURE — D9220A PRA ANESTHESIA: ICD-10-PCS | Mod: CRNA,,, | Performed by: NURSE ANESTHETIST, CERTIFIED REGISTERED

## 2019-12-09 PROCEDURE — C1713 ANCHOR/SCREW BN/BN,TIS/BN: HCPCS | Performed by: ORTHOPAEDIC SURGERY

## 2019-12-09 PROCEDURE — 76942 ECHO GUIDE FOR BIOPSY: CPT | Performed by: ANESTHESIOLOGY

## 2019-12-09 PROCEDURE — S0020 INJECTION, BUPIVICAINE HYDRO: HCPCS | Performed by: ANESTHESIOLOGY

## 2019-12-09 PROCEDURE — 37000009 HC ANESTHESIA EA ADD 15 MINS: Performed by: ORTHOPAEDIC SURGERY

## 2019-12-09 PROCEDURE — D9220A PRA ANESTHESIA: Mod: CRNA,,, | Performed by: NURSE ANESTHETIST, CERTIFIED REGISTERED

## 2019-12-09 PROCEDURE — 29826 SHO ARTHRS SRG DECOMPRESSION: CPT | Mod: LT,,, | Performed by: ORTHOPAEDIC SURGERY

## 2019-12-09 PROCEDURE — 71000016 HC POSTOP RECOV ADDL HR: Performed by: ORTHOPAEDIC SURGERY

## 2019-12-09 PROCEDURE — 23430 PR REPAIR BICEPS LONG TENDON: ICD-10-PCS | Mod: LT,,, | Performed by: ORTHOPAEDIC SURGERY

## 2019-12-09 PROCEDURE — 36000710: Performed by: ORTHOPAEDIC SURGERY

## 2019-12-09 PROCEDURE — 76942 PR U/S GUIDANCE FOR NEEDLE GUIDANCE: ICD-10-PCS | Mod: 26,,, | Performed by: ANESTHESIOLOGY

## 2019-12-09 PROCEDURE — 29823 SHO ARTHRS SRG XTNSV DBRDMT: CPT | Mod: 51,LT,, | Performed by: ORTHOPAEDIC SURGERY

## 2019-12-09 PROCEDURE — 63600175 PHARM REV CODE 636 W HCPCS: Performed by: NURSE PRACTITIONER

## 2019-12-09 PROCEDURE — 23430 REPAIR BICEPS TENDON: CPT | Mod: LT,,, | Performed by: ORTHOPAEDIC SURGERY

## 2019-12-09 PROCEDURE — 36000711: Performed by: ORTHOPAEDIC SURGERY

## 2019-12-09 PROCEDURE — 29826 PR SHLDR ARTHROSCOP,PART ACROMIOPLAS: ICD-10-PCS | Mod: LT,,, | Performed by: ORTHOPAEDIC SURGERY

## 2019-12-09 PROCEDURE — D9220A PRA ANESTHESIA: Mod: ANES,,, | Performed by: ANESTHESIOLOGY

## 2019-12-09 PROCEDURE — 71000015 HC POSTOP RECOV 1ST HR: Performed by: ORTHOPAEDIC SURGERY

## 2019-12-09 PROCEDURE — 64450 NJX AA&/STRD OTHER PN/BRANCH: CPT | Performed by: ORTHOPAEDIC SURGERY

## 2019-12-09 PROCEDURE — D9220A PRA ANESTHESIA: ICD-10-PCS | Mod: ANES,,, | Performed by: ANESTHESIOLOGY

## 2019-12-09 DEVICE — SYS IMPLANT BICEPS TENODESIS: Type: IMPLANTABLE DEVICE | Site: SHOULDER | Status: FUNCTIONAL

## 2019-12-09 RX ORDER — CELECOXIB 100 MG/1
400 CAPSULE ORAL
Status: COMPLETED | OUTPATIENT
Start: 2019-12-09 | End: 2019-12-09

## 2019-12-09 RX ORDER — LIDOCAINE HCL/PF 100 MG/5ML
SYRINGE (ML) INTRAVENOUS
Status: DISCONTINUED | OUTPATIENT
Start: 2019-12-09 | End: 2019-12-09

## 2019-12-09 RX ORDER — NEOSTIGMINE METHYLSULFATE 1 MG/ML
INJECTION, SOLUTION INTRAVENOUS
Status: DISCONTINUED | OUTPATIENT
Start: 2019-12-09 | End: 2019-12-09

## 2019-12-09 RX ORDER — EPINEPHRINE 1 MG/ML
INJECTION, SOLUTION INTRACARDIAC; INTRAMUSCULAR; INTRAVENOUS; SUBCUTANEOUS
Status: DISCONTINUED | OUTPATIENT
Start: 2019-12-09 | End: 2019-12-09 | Stop reason: HOSPADM

## 2019-12-09 RX ORDER — FENTANYL CITRATE 50 UG/ML
25 INJECTION, SOLUTION INTRAMUSCULAR; INTRAVENOUS EVERY 5 MIN PRN
Status: DISCONTINUED | OUTPATIENT
Start: 2019-12-09 | End: 2019-12-09 | Stop reason: HOSPADM

## 2019-12-09 RX ORDER — ROCURONIUM BROMIDE 10 MG/ML
INJECTION, SOLUTION INTRAVENOUS
Status: DISCONTINUED | OUTPATIENT
Start: 2019-12-09 | End: 2019-12-09

## 2019-12-09 RX ORDER — PROPOFOL 10 MG/ML
VIAL (ML) INTRAVENOUS
Status: DISCONTINUED | OUTPATIENT
Start: 2019-12-09 | End: 2019-12-09

## 2019-12-09 RX ORDER — HYDROCODONE BITARTRATE AND ACETAMINOPHEN 5; 325 MG/1; MG/1
1 TABLET ORAL ONCE AS NEEDED
Status: DISCONTINUED | OUTPATIENT
Start: 2019-12-09 | End: 2019-12-09 | Stop reason: HOSPADM

## 2019-12-09 RX ORDER — THYROID 60 MG/1
60 TABLET ORAL
Status: ON HOLD | COMMUNITY
End: 2020-12-28

## 2019-12-09 RX ORDER — ASPIRIN 81 MG/1
81 TABLET ORAL DAILY
Qty: 30 TABLET | Refills: 0 | Status: SHIPPED | OUTPATIENT
Start: 2019-12-09 | End: 2023-02-27

## 2019-12-09 RX ORDER — GLYCOPYRROLATE 0.2 MG/ML
INJECTION INTRAMUSCULAR; INTRAVENOUS
Status: DISCONTINUED | OUTPATIENT
Start: 2019-12-09 | End: 2019-12-09

## 2019-12-09 RX ORDER — BUPIVACAINE HYDROCHLORIDE 2.5 MG/ML
INJECTION, SOLUTION EPIDURAL; INFILTRATION; INTRACAUDAL
Status: DISCONTINUED
Start: 2019-12-09 | End: 2019-12-09 | Stop reason: WASHOUT

## 2019-12-09 RX ORDER — DOCUSATE SODIUM 100 MG/1
100 CAPSULE, LIQUID FILLED ORAL 2 TIMES DAILY PRN
Qty: 20 CAPSULE | Refills: 1 | Status: SHIPPED | OUTPATIENT
Start: 2019-12-09 | End: 2019-12-23

## 2019-12-09 RX ORDER — SODIUM CHLORIDE 9 MG/ML
INJECTION, SOLUTION INTRAVENOUS CONTINUOUS
Status: DISCONTINUED | OUTPATIENT
Start: 2019-12-09 | End: 2019-12-09

## 2019-12-09 RX ORDER — CEFAZOLIN SODIUM 2 G/50ML
2 SOLUTION INTRAVENOUS
Status: DISCONTINUED | OUTPATIENT
Start: 2019-12-09 | End: 2019-12-09 | Stop reason: RX

## 2019-12-09 RX ORDER — SODIUM CHLORIDE, SODIUM LACTATE, POTASSIUM CHLORIDE, CALCIUM CHLORIDE 600; 310; 30; 20 MG/100ML; MG/100ML; MG/100ML; MG/100ML
INJECTION, SOLUTION INTRAVENOUS CONTINUOUS PRN
Status: DISCONTINUED | OUTPATIENT
Start: 2019-12-09 | End: 2019-12-09

## 2019-12-09 RX ORDER — MEPERIDINE HYDROCHLORIDE 25 MG/ML
12.5 INJECTION INTRAMUSCULAR; INTRAVENOUS; SUBCUTANEOUS EVERY 10 MIN PRN
Status: DISCONTINUED | OUTPATIENT
Start: 2019-12-09 | End: 2019-12-09 | Stop reason: HOSPADM

## 2019-12-09 RX ORDER — VANCOMYCIN HYDROCHLORIDE 1 G/20ML
INJECTION, POWDER, LYOPHILIZED, FOR SOLUTION INTRAVENOUS
Status: DISCONTINUED
Start: 2019-12-09 | End: 2019-12-09 | Stop reason: HOSPADM

## 2019-12-09 RX ORDER — ACETAMINOPHEN 500 MG
1000 TABLET ORAL
Status: COMPLETED | OUTPATIENT
Start: 2019-12-09 | End: 2019-12-09

## 2019-12-09 RX ORDER — BUPIVACAINE HYDROCHLORIDE AND EPINEPHRINE 2.5; 5 MG/ML; UG/ML
INJECTION, SOLUTION EPIDURAL; INFILTRATION; INTRACAUDAL; PERINEURAL
Status: DISCONTINUED | OUTPATIENT
Start: 2019-12-09 | End: 2019-12-09 | Stop reason: HOSPADM

## 2019-12-09 RX ORDER — BUPIVACAINE HYDROCHLORIDE 2.5 MG/ML
30 INJECTION, SOLUTION EPIDURAL; INFILTRATION; INTRACAUDAL ONCE
Status: DISCONTINUED | OUTPATIENT
Start: 2019-12-09 | End: 2019-12-09 | Stop reason: HOSPADM

## 2019-12-09 RX ORDER — BUPIVACAINE HYDROCHLORIDE 2.5 MG/ML
INJECTION, SOLUTION INFILTRATION; PERINEURAL
Status: COMPLETED | OUTPATIENT
Start: 2019-12-09 | End: 2019-12-09

## 2019-12-09 RX ORDER — HYDROCODONE BITARTRATE AND ACETAMINOPHEN 5; 325 MG/1; MG/1
1 TABLET ORAL EVERY 4 HOURS PRN
Qty: 30 TABLET | Refills: 0 | Status: SHIPPED | OUTPATIENT
Start: 2019-12-09 | End: 2019-12-23

## 2019-12-09 RX ORDER — HYDROCODONE BITARTRATE AND ACETAMINOPHEN 7.5; 325 MG/1; MG/1
1 TABLET ORAL ONCE AS NEEDED
Status: COMPLETED | OUTPATIENT
Start: 2019-12-09 | End: 2019-12-09

## 2019-12-09 RX ORDER — FAMOTIDINE 20 MG/1
20 TABLET, FILM COATED ORAL
Status: COMPLETED | OUTPATIENT
Start: 2019-12-09 | End: 2019-12-09

## 2019-12-09 RX ORDER — CHLORHEXIDINE GLUCONATE ORAL RINSE 1.2 MG/ML
10 SOLUTION DENTAL
Status: DISCONTINUED | OUTPATIENT
Start: 2019-12-09 | End: 2019-12-09 | Stop reason: HOSPADM

## 2019-12-09 RX ORDER — SODIUM CHLORIDE, SODIUM LACTATE, POTASSIUM CHLORIDE, CALCIUM CHLORIDE 600; 310; 30; 20 MG/100ML; MG/100ML; MG/100ML; MG/100ML
INJECTION, SOLUTION INTRAVENOUS
Status: COMPLETED | OUTPATIENT
Start: 2019-12-09 | End: 2019-12-09

## 2019-12-09 RX ORDER — DEXAMETHASONE SODIUM PHOSPHATE 4 MG/ML
INJECTION, SOLUTION INTRA-ARTICULAR; INTRALESIONAL; INTRAMUSCULAR; INTRAVENOUS; SOFT TISSUE
Status: DISCONTINUED | OUTPATIENT
Start: 2019-12-09 | End: 2019-12-09

## 2019-12-09 RX ORDER — FENTANYL CITRATE 50 UG/ML
INJECTION, SOLUTION INTRAMUSCULAR; INTRAVENOUS
Status: DISCONTINUED | OUTPATIENT
Start: 2019-12-09 | End: 2019-12-09

## 2019-12-09 RX ORDER — ONDANSETRON 4 MG/1
4 TABLET, ORALLY DISINTEGRATING ORAL EVERY 6 HOURS PRN
Qty: 30 TABLET | Refills: 1 | Status: SHIPPED | OUTPATIENT
Start: 2019-12-09 | End: 2019-12-23

## 2019-12-09 RX ORDER — LIDOCAINE HYDROCHLORIDE 10 MG/ML
0.5 INJECTION, SOLUTION EPIDURAL; INFILTRATION; INTRACAUDAL; PERINEURAL ONCE
Status: DISCONTINUED | OUTPATIENT
Start: 2019-12-09 | End: 2022-05-06

## 2019-12-09 RX ORDER — ONDANSETRON 2 MG/ML
INJECTION INTRAMUSCULAR; INTRAVENOUS
Status: DISCONTINUED | OUTPATIENT
Start: 2019-12-09 | End: 2019-12-09

## 2019-12-09 RX ORDER — MIDAZOLAM HYDROCHLORIDE 1 MG/ML
INJECTION, SOLUTION INTRAMUSCULAR; INTRAVENOUS
Status: DISCONTINUED | OUTPATIENT
Start: 2019-12-09 | End: 2019-12-09

## 2019-12-09 RX ORDER — VANCOMYCIN HYDROCHLORIDE 1 G/20ML
INJECTION, POWDER, LYOPHILIZED, FOR SOLUTION INTRAVENOUS
Status: DISCONTINUED | OUTPATIENT
Start: 2019-12-09 | End: 2019-12-09 | Stop reason: HOSPADM

## 2019-12-09 RX ORDER — CEFAZOLIN SODIUM 1 G/50ML
2 SOLUTION INTRAVENOUS
Status: DISCONTINUED | OUTPATIENT
Start: 2019-12-09 | End: 2019-12-09 | Stop reason: HOSPADM

## 2019-12-09 RX ORDER — EPINEPHRINE 1 MG/ML
INJECTION, SOLUTION INTRACARDIAC; INTRAMUSCULAR; INTRAVENOUS; SUBCUTANEOUS
Status: DISCONTINUED
Start: 2019-12-09 | End: 2019-12-09 | Stop reason: HOSPADM

## 2019-12-09 RX ORDER — BUPIVACAINE HYDROCHLORIDE AND EPINEPHRINE 2.5; 5 MG/ML; UG/ML
INJECTION, SOLUTION EPIDURAL; INFILTRATION; INTRACAUDAL; PERINEURAL
Status: DISCONTINUED
Start: 2019-12-09 | End: 2019-12-09 | Stop reason: HOSPADM

## 2019-12-09 RX ORDER — SODIUM CHLORIDE, SODIUM LACTATE, POTASSIUM CHLORIDE, CALCIUM CHLORIDE 600; 310; 30; 20 MG/100ML; MG/100ML; MG/100ML; MG/100ML
INJECTION, SOLUTION INTRAVENOUS CONTINUOUS
Status: ACTIVE | OUTPATIENT
Start: 2019-12-09 | End: 2019-12-09

## 2019-12-09 RX ADMIN — PROPOFOL 180 MG: 10 INJECTION, EMULSION INTRAVENOUS at 07:12

## 2019-12-09 RX ADMIN — CEFAZOLIN SODIUM 2 G: 1 SOLUTION INTRAVENOUS at 07:12

## 2019-12-09 RX ADMIN — FENTANYL CITRATE 50 MCG: 50 INJECTION, SOLUTION INTRAMUSCULAR; INTRAVENOUS at 09:12

## 2019-12-09 RX ADMIN — HYDROCODONE BITARTRATE AND ACETAMINOPHEN 1 TABLET: 7.5; 325 TABLET ORAL at 10:12

## 2019-12-09 RX ADMIN — CELECOXIB 400 MG: 100 CAPSULE ORAL at 06:12

## 2019-12-09 RX ADMIN — FENTANYL CITRATE 50 MCG: 50 INJECTION, SOLUTION INTRAMUSCULAR; INTRAVENOUS at 08:12

## 2019-12-09 RX ADMIN — DEXAMETHASONE SODIUM PHOSPHATE 8 MG: 4 INJECTION, SOLUTION INTRA-ARTICULAR; INTRALESIONAL; INTRAMUSCULAR; INTRAVENOUS; SOFT TISSUE at 07:12

## 2019-12-09 RX ADMIN — NEOSTIGMINE METHYLSULFATE 5 MG: 1 INJECTION INTRAVENOUS at 09:12

## 2019-12-09 RX ADMIN — MIDAZOLAM 1 MG: 1 INJECTION INTRAMUSCULAR; INTRAVENOUS at 07:12

## 2019-12-09 RX ADMIN — BUPIVACAINE HYDROCHLORIDE 30 ML: 2.5 INJECTION, SOLUTION INFILTRATION; PERINEURAL at 07:12

## 2019-12-09 RX ADMIN — CEFAZOLIN SODIUM 2 G: 1 SOLUTION INTRAVENOUS at 10:12

## 2019-12-09 RX ADMIN — FENTANYL CITRATE 25 MCG: 0.05 INJECTION, SOLUTION INTRAMUSCULAR; INTRAVENOUS at 10:12

## 2019-12-09 RX ADMIN — SODIUM CHLORIDE, SODIUM LACTATE, POTASSIUM CHLORIDE, AND CALCIUM CHLORIDE: .6; .31; .03; .02 INJECTION, SOLUTION INTRAVENOUS at 06:12

## 2019-12-09 RX ADMIN — LIDOCAINE HYDROCHLORIDE 40 MG: 20 INJECTION, SOLUTION INTRAVENOUS at 07:12

## 2019-12-09 RX ADMIN — SODIUM CHLORIDE, SODIUM LACTATE, POTASSIUM CHLORIDE, AND CALCIUM CHLORIDE: 600; 310; 30; 20 INJECTION, SOLUTION INTRAVENOUS at 07:12

## 2019-12-09 RX ADMIN — ONDANSETRON 4 MG: 2 INJECTION, SOLUTION INTRAMUSCULAR; INTRAVENOUS at 09:12

## 2019-12-09 RX ADMIN — MEPERIDINE HYDROCHLORIDE 12.5 MG: 25 INJECTION INTRAMUSCULAR; INTRAVENOUS; SUBCUTANEOUS at 11:12

## 2019-12-09 RX ADMIN — ROCURONIUM BROMIDE 50 MG: 10 INJECTION, SOLUTION INTRAVENOUS at 07:12

## 2019-12-09 RX ADMIN — GLYCOPYRROLATE 0.6 MG: 0.2 INJECTION INTRAMUSCULAR; INTRAVENOUS at 09:12

## 2019-12-09 RX ADMIN — FAMOTIDINE 20 MG: 20 TABLET ORAL at 06:12

## 2019-12-09 RX ADMIN — ACETAMINOPHEN 1000 MG: 500 TABLET ORAL at 06:12

## 2019-12-09 RX ADMIN — SODIUM CHLORIDE, SODIUM LACTATE, POTASSIUM CHLORIDE, AND CALCIUM CHLORIDE: 600; 310; 30; 20 INJECTION, SOLUTION INTRAVENOUS at 08:12

## 2019-12-09 NOTE — ANESTHESIA PROCEDURE NOTES
Peripheral Block    Patient location during procedure: holding area   Block not for primary anesthetic.  Reason for block: at surgeon's request and post-op pain management   Post-op Pain Location: left shoulder  Start time: 12/9/2019 7:04 AM  Timeout: 12/9/2019 7:02 AM   End time: 12/9/2019 7:13 AM    Staffing  Authorizing Provider: Luis Miguel Paz MD  Performing Provider: Luis Miguel Paz MD    Preanesthetic Checklist  Completed: patient identified, site marked, surgical consent, pre-op evaluation, timeout performed, IV checked, risks and benefits discussed and monitors and equipment checked  Peripheral Block  Patient position: sitting  Prep: ChloraPrep  Patient monitoring: continuous pulse ox, cardiac monitor, heart rate and frequent blood pressure checks  Block type: interscalene  Laterality: left  Injection technique: single shot  Needle  Needle type: Quincke   Needle gauge: 25 G  Needle length: 1.5 in  Needle localization: ultrasound guidance   -ultrasound image captured on disc.  Assessment  Injection assessment: negative aspiration, negative parasthesia and local visualized surrounding nerve  Heart rate change: no  Slow fractionated injection: yes

## 2019-12-09 NOTE — INTERVAL H&P NOTE
The patient has been examined and the H&P has been reviewed:    I concur with the findings and no changes have occurred since H&P was written.     Left shoulder exam under anesthesia  Left shoulder possible manipulation under anesthesia  Left shoulder arthroscopy  Left shoulder subacromial decompression  Left shoulder biceps tenodesis  Left shoulder possible rotator cuff debridement vs. repair       Anesthesia/Surgery risks, benefits and alternative options discussed and understood by patient/family.          Active Hospital Problems    Diagnosis  POA    Left shoulder pain [M25.512]  Yes    Primary osteoarthritis of left shoulder [M19.012]  Yes      Resolved Hospital Problems   No resolved problems to display.

## 2019-12-09 NOTE — PLAN OF CARE
"After discharge instructions and getting pt dressed, wife stated she "would feel more comfortable if someone looked at him before discharge" stating she fears what happened after his vasectomy might happen again.  Reassured pt and wife that pt is stable, all vital signs within normal limits.  Pt responding to voice, drinking juice and eating granola bar.  Dr. Paz notified at 11:58 that wife would like to see MD.  Dr. Knight called at 12:05 and will come evaluate the pt.  Dr. Knight at bedside at 12:30, pt condition remains stable.  After meeting with Dr. Knight, both wife and pt state they feel ready to go home.  Pt instructed by Dr. Knight and LOU Suresh, to move carefully with assistance today and while taking pain medication.  Pt and wife verbalize understanding of all discharge instructions and state they will call MD with any questions, concerns, or changes in pt status.  Pt was able to ambulate from bed to wheelchair with standby assistance.  Pt in stable condition upon discharge.    "

## 2019-12-09 NOTE — TRANSFER OF CARE
"Anesthesia Transfer of Care Note    Patient: Colby Yepez    Procedure(s) Performed: Procedure(s) (LRB):  ARTHROSCOPY, SHOULDER, WITH SUBACROMIAL SPACE DECOMPRESSION (Left)  REPAIR, ROTATOR CUFF (Left)    Patient location: PACU    Anesthesia Type: general    Transport from OR: Transported from OR on room air with adequate spontaneous ventilation    Post pain: adequate analgesia    Post assessment: no apparent anesthetic complications and tolerated procedure well    Post vital signs: stable    Level of consciousness: sedated    Nausea/Vomiting: no nausea/vomiting    Complications: none    Transfer of care protocol was followed      Last vitals:   Visit Vitals  /76   Pulse 61   Temp 37.1 °C (98.8 °F)   Resp 16   Ht 6' 2" (1.88 m)   Wt 107.6 kg (237 lb 3.4 oz)   SpO2 97%   BMI 30.46 kg/m²     "

## 2019-12-09 NOTE — DISCHARGE INSTRUCTIONS
Outpatient Shoulder Surgery Discharge Instructions (Dr. Patton)     1. Keep non weight bearing to LEFT upper extremity     Okay for wrist and elbow ROM as tolerated     No active flexion left elbow (no active biceps flexion, okay for passive ROM of elbow)    Okay for active range of motion of left shoulder to max 90 degrees forward flexion     Stay in sling with bump (the black pillow) all times except for hygeine     Okay to shower in 3 days, remove dressing before shower. Leave blue sutures in place. Keeping arm at the side in shower, when washing under arm pit, lean forward. Do not lift your arm.     Pat dry with clean towel, can place band aids over incisions after shower.     2. Watch for fevers, increasing pain, spreading redness - these are signs of infection - notify Dr. Patton's office immediately if you have these symptoms.     3. Ice frequently protecting skin, for example, bags of frozen corn and peas can be helpful for this; ice for 30 minutes on and 30 minutes off     4. Take pain medication only as prescribed. No driving      Follow up in clinic with Dr. Patton in 12-14 days, please call the office [phone] and ask to speak with Dr. Patton's nursing staff  if you are having any issues or problems.

## 2019-12-09 NOTE — OP NOTE
Operative Note       Surgery Date: 12/9/2019     Surgeon(s) and Role:     * Lenny Patton MD - Primary    Pre-op Diagnosis:      Chronic left shoulder pain [M25.512, G89.29]  Superior glenoid labrum lesion of left shoulder, initial encounter [S43.432A]  Impingement syndrome of left shoulder [M75.42]  Tendinosis of left shoulder [M67.912]  Tendonitis of left rotator cuff [M75.82]  Tendonitis of upper biceps tendon of left shoulder [M75.22]    Post-op Diagnosis:     Chronic left shoulder pain [M25.512, G89.29]  Superior glenoid labrum lesion of left shoulder  Impingement syndrome of left shoulder [M75.42]  Tendinosis of left shoulder [M67.912]  Tendonitis of left rotator cuff [M75.82]  Tendonitis of upper biceps tendon of left shoulder [M75.22]  Left shoulder chondromalacia  Left shoulder osteoarthritis, mild    Procedure(s) (LRB):    Left shoulder exam under anesthesia  Left shoulder arthroscopy with extensive arthroscopic labral debridement  Left shoulder arthroscopic chondroplasty glenoid - inferior  Left shoulder arthroscopic subacromial decompression  Left shoulder arthroscopic assisted biceps tenodesis      Anesthesia: General    Estimated Blood Loss: minimal           Specimens: None    Complications: None        Implants:   Implant Name Type Inv. Item Serial No.  Lot No. LRB No. Used   SYS IMPLANT BICEPS TENODESIS - [id number]  SYS IMPLANT BICEPS TENODESIS  [employer] [id number] Left 1       OPERATIVE INDICATIONS: Colby Yepez is a 54 y.o. male with ongoiong left shoulder pain, with the above noted preoperative diagnoses, for which he  failed extensive non operative treatment. I discussed with Colby Yepez   the potential benefits, indications, alternatives and risks of above noted procedures.      I discussed with the patient the risks of surgery including but not limited to infection, damage to blood vessels and nerves, bleeding, stiffness of the shoulder, risk of anesthesia  including stroke, heart attack, death, possible re-tear of any repair, failure to heal any repair, possible Audie deformity, possible continued pain, possible need for more surgery. Colby Yepez expressed good understanding of all of the above and wished to proceed with LEFT shoulder arthroscopy with the above-noted procedures. No guarantees given nor implied.      Operative Details:        Positioning and Exam Under Anesthesia     Colby Yepez was marked in the preoperative holding area by me with patient participation. He was brought to the operating room and laid supine on the table. General endotracheal anesthesia was then induced. The LEFT shoulder was examined with the patient supine, he had 170° of forward flexion passively, 30° of external rotation at side. He had 90° of external rotation with abduction and 20 °of internal rotation with abduction. The shoulder was stable to both anterior and posterior load and shift testing. There was no need for a manipulation under anesthesia. The patient was then transferred to the lateral decubitus position. He was positioned on the beanbag with an axillary roll under the upper ribs. The peroneal nerve on the right and left legs was padded with 4 pillows. A bear hugger warming blanket was placed. SCDs were placed to the bilateral legs.  All bony prominences were well-padded. The operative shoulder was then suspended with a lateral traction device. 10 pounds of traction was applied.  The LEFT shoulder and upper extremity were prepped and draped in normal sterile fashion with isopropyl alcohol pre prep and then formal standard ChloraPrep. A timeout was then performed identifying the proper patient, proper site, proper procedure and administration of IV antibiotics before the skin incision. All in the room agreed. The anatomic landmarks of the acromion, the coracoid, the clavicle and the expected arthroscopic portal sites - posterior, anterolateral and anterior  - were then marked. Several milliliters of 0.25% marcaine with epinephrine were injected to the portal sites subcutaneously and several milliliters were then injected to the subacromial space. An 11 blade scalpel was then used to incise the skin superficially and establish the standard posterior portal. The arthroscopic trocar and cannula was then introduced atraumatically into the glenohumeral joint. The scope was then introduced and diagnostic arthroscopy was then performed.        Diagnostic Arthroscopy     Diagnostic arthroscopy demonstrated severe synovitis surrounding the superior labrum and capsule both anteriorly and posteriorly from 2 o'clock to 10 o'clock position, synovitis posterior to anchor was impressive. There was type II SLAP tear here especially at and anterior to biceps anchor.  The subscapularis was identified and was found to be intact with no upper boarder fraying or tear. The anterosuperior labrum had tearing component from SLAP tear. The anteroinferior labrum was intact. The inferior labrum was torn with seperation from its attatchmetn to inferior glenoid as well as extensive fraying. The posterior inferior labrum was intact. The posterior superior labrum had mild fraying, mild synovitis. The humeral head articular surface was intact. The inferior glenoid had grade III changes to 20% surface area of the glenoid, all inferior. The superior and middle glenoid was intact. The supraspinatus insertion was intact. The infraspinatus insertion was intact. There were no additional loose bodies in the axillary recess posteriorly.         Arthroscopic Debridement and Biceps, Chondroplasty     At this point, a working portal was established anteriorly, in the rotator interval, first with an 18 gauge spinal needle and then with the dilator and switching stick and then finally with an arthroscopic cannula. The arthroscopic probe was used to pull the biceps tendon into the joint. There was  moderate  synovitis to the to the long head of the biceps tendon that was pulled from the groove. The arthroscopic shaver was then used to remove synovitis anterior and posterior to the biceps anchor from 2 o'clock to 10 o'clock.  Shaver was introduced and the inferior glenoid labral tearing was debrided with combination of shaver and biter to stable boarder. The arthroscopic shaver was also introduced to perform chondroplasty of the inferior aspect of the glenoid stabilizing any unstable flaps edges of articular cartilage to a stable base.  The instruments were then transitioned such that the camera was in the front working portal, and arthroscopic biter was used to trim the unstable redundant edges of the superior labral tear specially at the biceps anchor and then anterior to this.  She was introduced to remove any debris. There was a loose body that was approximately 5 x 3 mm knows debrided with the arthroscopic shaver.  The arthroscopic biter was then introduced and the arthroscopic portion of the biceps tenodesis was performed releasing the biceps from the superior labrum.  With the biceps completely released the tendon retracted towards the groove. The superior labrum posterior was lightly debrided anteriorly and posteriorly with arthroscopic shaver, lightly any debriding degenerative tearing, fraying and redundant labrum, from 1 o'clock to 10 o'clock.         Subacromial Decompression with Acromioplasty     Attention was then turned to the subacromial space. The trocar was then redirected subacromially and swept laterally to the anterolateral tip of the acromion. There was mild inflamed bursitis here. The lateral working portal was established with spinal needle and then switching stick.  The shaver was introduced and a thorough subacromial bursectomy was performed with great care to avoid damage to the rotator cuff. Extensive debridement and extensive bursectomy was performed here as the patient had mild thick bursa  and deltoid adhesions. The power rasp and eve were then introduced and a subacromial decompression was performed raising the anterolateral aspect of the acromion 5-6 mm and this was carried posteriorly, tapering as I went posteriorly to match the geometry and anatomy of the posterior acromion, very careful to avoid destabilizing the os acromiale.            Bursal Sided Rotator Cuff Assessment     Attention was then turned back to the rotator cuff. Extensive and careful probing was performed and there was no tear identified. There was no significant bursal sided fraying or tear. At this point, the shoulder was copiously irrigated with arthroscopic fluid removing any debris present and the remaining arthroscopic fluid was suctioned dry.       Biceps Tenodesis     Incision was planned for approach for subpectoral biceps tenodesis. The shoulder was repositioned. Another Chloraprep was used on the planned operative area. Incision was made with 10 blade through skin only. Subcutaneous dissection was performed bluntly to the fascia. The fascia was carefully split identifying the interval between the pectoralis major and the short head biceps muscle. Finger dissection was performed and [employer] retractor was placed laterally retracting the pectoralis major tendon superiorly and laterally. The long head of the biceps tendon that had previously been released arthroscopically was identified. The tendon was retrieved with right angle hemostat. The musculotendinous junction of the tendon was identified and marked. Whipstitch with Fiber loop was started just distal to the junction and carried for approximately 3.9 cm. The excess tendon was cut and discarded. At the superior margin of the pectoralis tendon reflection, the distal margin of the bicepital groove was palpated. Swivel lock drill was placed high in the wound at this margin. Anterior cortex drilled, anterior cortex was tapped. Arthrex 5.5 biceps swivel lock was loaded  and drilled for, inserted, and seated well with good fixation. The swivel lock with biceps tendon and Fiber loop sutures were then fixed per standard procedure. Limb from the whipstitch was then passed through the tendon and tied with several square knots completing the loop. There was good resting tension of the long head of the biceps tendon with strong fixation and this felt satisfactory.         Closure and Sling Placement     The wound was copiously irrigated with normal saline. The fascia was closed with 0 Vicryl. The subcutaneous tissue and deep dermis were closed with 2-0  Vicryl. Skin closed with running 2-0 prolene.     Portal sites were then closed with arthroscopic mattress 3-0 prolene sutures.  Xeroform, sterile guaze dressings, ABDs and tape were applied for sterile dressing to the shoulder. Abduction sling was applied with the bump. General anesthesia was reversed and Colby Yepez was taken to the PACU in stable condition.      Plan will include sling to operative extremity, no lifting, no active biceps flexion. Colby Yepez will come back to the clinic in 10 - 14 days for wound check.

## 2019-12-23 ENCOUNTER — TELEPHONE (OUTPATIENT)
Dept: ORTHOPEDICS | Facility: CLINIC | Age: 54
End: 2019-12-23

## 2019-12-23 ENCOUNTER — OFFICE VISIT (OUTPATIENT)
Dept: SPORTS MEDICINE | Facility: CLINIC | Age: 54
End: 2019-12-23
Payer: OTHER GOVERNMENT

## 2019-12-23 VITALS
HEART RATE: 69 BPM | DIASTOLIC BLOOD PRESSURE: 73 MMHG | SYSTOLIC BLOOD PRESSURE: 107 MMHG | HEIGHT: 74 IN | BODY MASS INDEX: 30.42 KG/M2 | WEIGHT: 237 LBS

## 2019-12-23 DIAGNOSIS — Z09 POSTOP CHECK: ICD-10-CM

## 2019-12-23 DIAGNOSIS — M75.22 BICEPS TENDINITIS OF LEFT SHOULDER: Primary | ICD-10-CM

## 2019-12-23 DIAGNOSIS — M75.42 IMPINGEMENT SYNDROME OF LEFT SHOULDER: ICD-10-CM

## 2019-12-23 PROCEDURE — 99213 OFFICE O/P EST LOW 20 MIN: CPT | Mod: PBBFAC | Performed by: PHYSICIAN ASSISTANT

## 2019-12-23 PROCEDURE — 99024 PR POST-OP FOLLOW-UP VISIT: ICD-10-PCS | Mod: ,,, | Performed by: PHYSICIAN ASSISTANT

## 2019-12-23 PROCEDURE — 99999 PR PBB SHADOW E&M-EST. PATIENT-LVL III: ICD-10-PCS | Mod: PBBFAC,,, | Performed by: PHYSICIAN ASSISTANT

## 2019-12-23 PROCEDURE — 99024 POSTOP FOLLOW-UP VISIT: CPT | Mod: ,,, | Performed by: PHYSICIAN ASSISTANT

## 2019-12-23 PROCEDURE — 99999 PR PBB SHADOW E&M-EST. PATIENT-LVL III: CPT | Mod: PBBFAC,,, | Performed by: PHYSICIAN ASSISTANT

## 2019-12-23 NOTE — PROGRESS NOTES
Patient ID: Colby Yepez is a 54 y.o. male.    Chief Complaint: Post-op Evaluation and Pain of the Left Shoulder      HPI: Colby Yepez  is a 54 y.o. male who c/o Post-op Evaluation and Pain of the Left Shoulder   for duration of 2 weeks.  He underwent left shoulder arthroscopy with biceps tenodesis, subacromial decompression, and distal clavicle excision by Dr. Patton on 12/09/2019.  He comes today for his 1st postoperative evaluation.  Pain level is 5/10.  Quality is aching and burning.  Alleviating factors include Tylenol.  He takes 1 or 2 pain pills a week.  He does not take Tylenol concurrently.  He has been using his postop sling.  He has started Codman exercises and table slides per his report.  He is asking for clarifications on his restrictions.  Additionally, he mentions that the tip of his left index finger is bothering him.    Past Medical History:   Diagnosis Date    Allergy     Hypotension, iatrogenic     Kidney stone     Liver cyst     Obesity     Thyroid disease      Past Surgical History:   Procedure Laterality Date    ARTHROSCOPIC DEBRIDEMENT OF SHOULDER Left 12/9/2019    Procedure: DEBRIDEMENT, SHOULDER, ARTHROSCOPIC;  Surgeon: Lenny Patton MD;  Location: [facility];  Service: Orthopedics;  Laterality: Left;    ARTHROSCOPY OF SHOULDER WITH DECOMPRESSION OF SUBACROMIAL SPACE Left 12/9/2019    Procedure: ARTHROSCOPY, SHOULDER, WITH SUBACROMIAL SPACE DECOMPRESSION;  Surgeon: Lenny Patton MD;  Location: [facility] OR;  Service: Orthopedics;  Laterality: Left;    BICEPS TENDON REPAIR Left 12/9/2019    Procedure: REPAIR, TENDON, BICEPS;  Surgeon: Lenny Patton MD;  Location: [facility] OR;  Service: Orthopedics;  Laterality: Left;  arthroscopic bicep tenodesis    CHONDROPLASTY OF SHOULDER Left 12/9/2019    Procedure: CHONDROPLASTY, SHOULDER;  Surgeon: Lenny Patton MD;  Location: [facility] OR;  Service: Orthopedics;  Laterality: Left;  arthroscopic    COLONOSCOPY N/A  5/13/2019    Procedure: COLONOSCOPY;  Surgeon: Soha Norton MD;  Location: [facility] ENDO;  Service: Endoscopy;  Laterality: N/A;    EXAMINATION UNDER ANESTHESIA Left 12/9/2019    Procedure: EXAM UNDER ANESTHESIA;  Surgeon: Lenny Patton MD;  Location: [facility] OR;  Service: Orthopedics;  Laterality: Left;    GANGLION CYST EXCISION Left     INJECTION OF JOINT Left 7/25/2019    Procedure: Left shoulder Joint injection with local;  Surgeon: Leonardo Collado MD;  Location: [facility] PAIN MGT;  Service: Pain Management;  Laterality: Left;    SINUS SURGERY  2007    TONSILLECTOMY, ADENOIDECTOMY, BILATERAL MYRINGOTOMY AND TUBES      VASECTOMY  10/27/2017     Family History   Problem Relation Age of Onset    Hypertension Mother     Prostate cancer Father     Hyperlipidemia Father      Social History     Socioeconomic History    Marital status:      Spouse name: Not on file    Number of children: 3    Years of education: Not on file    Highest education level: Not on file   Occupational History     Employer: [employer]   Social Needs    Financial resource strain: Not on file    Food insecurity:     Worry: Not on file     Inability: Not on file    Transportation needs:     Medical: Not on file     Non-medical: Not on file   Tobacco Use    Smoking status: Never Smoker    Smokeless tobacco: Never Used   Substance and Sexual Activity    Alcohol use: Yes     Frequency: 2-4 times a month     Drinks per session: 1 or 2     Binge frequency: Never     Comment: monthly    Drug use: No    Sexual activity: Not on file   Lifestyle    Physical activity:     Days per week: Not on file     Minutes per session: Not on file    Stress: Not on file   Relationships    Social connections:     Talks on phone: Not on file     Gets together: Not on file     Attends [employer] service: Not on file     Active member of club or organization: Not on file     Attends meetings of clubs or organizations: Not on file      Relationship status: Not on file   Other Topics Concern    Not on file   Social History Narrative    Not on file     Medication List with Changes/Refills   Current Medications    ASPIRIN (ECOTRIN) 81 MG EC TABLET    Take 1 tablet (81 mg total) by mouth once daily. With food    DOCUSATE SODIUM (COLACE) 100 MG CAPSULE    Take 1 capsule (100 mg total) by mouth 2 (two) times daily as needed for Constipation.    HYDROCODONE-ACETAMINOPHEN (NORCO) 5-325 MG PER TABLET    Take 1 tablet by mouth every 4 (four) hours as needed for Pain. Medically necessary for greater than 7 days    IBUPROFEN (ADVIL,MOTRIN) 800 MG TABLET    TAKE ONE TABLET BY MOUTH THREE TIMES DAILY AS NEEDED FOR PAIN    MULTIVITAMIN (ONE DAILY MULTIVITAMIN) PER TABLET    Take 1 tablet by mouth once daily. Hold 5 days prior to surgery    ONDANSETRON (ZOFRAN-ODT) 4 MG TBDL    Take 1 tablet (4 mg total) by mouth every 6 (six) hours as needed (nausea).    THYROID, PORK, (ARMOUR THYROID) 60 MG TAB    Take 60 mg by mouth before breakfast.    TIZANIDINE (ZANAFLEX) 4 MG TABLET    Take 1 tablet (4 mg total) by mouth 3 (three) times daily as needed.     Review of patient's allergies indicates:   Allergen Reactions    Tramadol Other (See Comments)     dizziness           Objective:                    Left Shoulder Exam     Comments:  Incision clean/dry/intact  Sutures in place  No erythema/drainage/signs of infection  Compartments soft  Cap refill < 2 sec  2+ pulse  Sensation intact   Some pain free PROM shoulder/elbow  ROM intact left hand and index finger          Vascular Exam       Capillary Refill  Left Hand: normal capillary refill              Assessment:       Encounter Diagnoses   Name Primary?    Biceps tendinitis of left shoulder Yes    Impingement syndrome of left shoulder     Postop check           Plan:       Colby was seen today for post-op evaluation and pain.    Diagnoses and all orders for this visit:    Biceps tendinitis of left  shoulder    Impingement syndrome of left shoulder    Postop check        Colby Yepez is an established pt here for postop follow-up care. He will continue to avoid active flexion of the elbow.  I will get clarification for him with regards to range of motion of the shoulder.  Also asked for clarification on when he should start physical therapy.  I suspect Dr. Patton would like him to hold off on therapy 4-6 weeks postoperatively.  I will get him a note saying that he is out of work from the date of surgery until his follow-up with Dr. Patton.  I would defer any further restrictions for work to Dr. Patton as he will continue to follow him along.  If the patient has any questions or concerns regarding his left shoulder, I have asked that he direct those towards Dr. Patton.  I will touch base with him once I am able to speak with Dr. Patton with regards to therapy recommendations and restrictions.  The incisions were cleaned with ChloraPrep and alcohol.  All sutures (and/or staples) were removed.  Suture strips were applied across the incision and should remain in place until they fall off in approximately 2 weeks. The patient was instructed not to soak the incision in standing water.  Patient may clean the incision with clean running water and antibacterial soap.  Patient should notify the office if any signs or symptoms of infection including fevers, erythema, purulent drainage, increasing pain. He will follow up with Dr. Patton in 4 weeks.  Patient verbalizes understanding and agrees.    No follow-ups on file.          The patient understands, chooses and consents to this plan and accepts all   the risks which include but are not limited to the risks mentioned above.     Disclaimer: This note was prepared using a voice recognition system and is likely to have sound alike errors within the text.

## 2019-12-23 NOTE — TELEPHONE ENCOUNTER
----- Message from Laura Thakur sent at 12/23/2019  9:22 AM CST -----  Contact: Patient   Patient running 10 minutes late due to traffic, please call to advise at Ph .[phone] (home)

## 2020-01-10 DIAGNOSIS — M75.22 BICEPS TENDINITIS OF LEFT SHOULDER: Primary | ICD-10-CM

## 2020-01-10 DIAGNOSIS — M75.42 IMPINGEMENT SYNDROME OF LEFT SHOULDER: ICD-10-CM

## 2020-01-21 ENCOUNTER — OFFICE VISIT (OUTPATIENT)
Dept: ORTHOPEDICS | Facility: CLINIC | Age: 55
End: 2020-01-21
Payer: OTHER GOVERNMENT

## 2020-01-21 VITALS
DIASTOLIC BLOOD PRESSURE: 81 MMHG | SYSTOLIC BLOOD PRESSURE: 124 MMHG | HEART RATE: 65 BPM | WEIGHT: 243 LBS | BODY MASS INDEX: 31.18 KG/M2 | HEIGHT: 74 IN

## 2020-01-21 DIAGNOSIS — M75.42 IMPINGEMENT SYNDROME OF LEFT SHOULDER: ICD-10-CM

## 2020-01-21 DIAGNOSIS — R52 PAIN: ICD-10-CM

## 2020-01-21 DIAGNOSIS — M67.814 TENDINOSIS OF LEFT SHOULDER: ICD-10-CM

## 2020-01-21 DIAGNOSIS — M25.512 CHRONIC LEFT SHOULDER PAIN: Primary | ICD-10-CM

## 2020-01-21 DIAGNOSIS — M75.22 TENDONITIS OF UPPER BICEPS TENDON OF LEFT SHOULDER: ICD-10-CM

## 2020-01-21 DIAGNOSIS — S43.432D SUPERIOR GLENOID LABRUM LESION OF LEFT SHOULDER, SUBSEQUENT ENCOUNTER: ICD-10-CM

## 2020-01-21 DIAGNOSIS — G89.29 CHRONIC LEFT SHOULDER PAIN: Primary | ICD-10-CM

## 2020-01-21 PROCEDURE — 99999 PR PBB SHADOW E&M-EST. PATIENT-LVL III: ICD-10-PCS | Mod: PBBFAC,,, | Performed by: ORTHOPAEDIC SURGERY

## 2020-01-21 PROCEDURE — 99213 OFFICE O/P EST LOW 20 MIN: CPT | Mod: PBBFAC | Performed by: ORTHOPAEDIC SURGERY

## 2020-01-21 PROCEDURE — 99024 PR POST-OP FOLLOW-UP VISIT: ICD-10-PCS | Mod: ,,, | Performed by: ORTHOPAEDIC SURGERY

## 2020-01-21 PROCEDURE — 99024 POSTOP FOLLOW-UP VISIT: CPT | Mod: ,,, | Performed by: ORTHOPAEDIC SURGERY

## 2020-01-21 PROCEDURE — 99999 PR PBB SHADOW E&M-EST. PATIENT-LVL III: CPT | Mod: PBBFAC,,, | Performed by: ORTHOPAEDIC SURGERY
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
## 2020-03-03 ENCOUNTER — TELEPHONE (OUTPATIENT)
Dept: ORTHOPEDICS | Facility: CLINIC | Age: 55
End: 2020-03-03

## 2020-03-03 ENCOUNTER — OFFICE VISIT (OUTPATIENT)
Dept: ORTHOPEDICS | Facility: CLINIC | Age: 55
End: 2020-03-03
Payer: OTHER GOVERNMENT

## 2020-03-03 VITALS
WEIGHT: 243 LBS | HEIGHT: 74 IN | DIASTOLIC BLOOD PRESSURE: 77 MMHG | BODY MASS INDEX: 31.18 KG/M2 | HEART RATE: 75 BPM | SYSTOLIC BLOOD PRESSURE: 115 MMHG

## 2020-03-03 DIAGNOSIS — M25.512 CHRONIC LEFT SHOULDER PAIN: Primary | ICD-10-CM

## 2020-03-03 DIAGNOSIS — G89.29 CHRONIC LEFT SHOULDER PAIN: Primary | ICD-10-CM

## 2020-03-03 DIAGNOSIS — M75.22 TENDONITIS OF UPPER BICEPS TENDON OF LEFT SHOULDER: ICD-10-CM

## 2020-03-03 DIAGNOSIS — M25.512 CHRONIC LEFT SHOULDER PAIN: ICD-10-CM

## 2020-03-03 DIAGNOSIS — M75.42 IMPINGEMENT SYNDROME OF LEFT SHOULDER: Primary | ICD-10-CM

## 2020-03-03 DIAGNOSIS — S43.432D SUPERIOR GLENOID LABRUM LESION OF LEFT SHOULDER, SUBSEQUENT ENCOUNTER: ICD-10-CM

## 2020-03-03 DIAGNOSIS — G89.29 CHRONIC LEFT SHOULDER PAIN: ICD-10-CM

## 2020-03-03 PROCEDURE — 99024 PR POST-OP FOLLOW-UP VISIT: ICD-10-PCS | Mod: ,,, | Performed by: ORTHOPAEDIC SURGERY

## 2020-03-03 PROCEDURE — 99024 POSTOP FOLLOW-UP VISIT: CPT | Mod: ,,, | Performed by: ORTHOPAEDIC SURGERY

## 2020-03-03 PROCEDURE — 99999 PR PBB SHADOW E&M-EST. PATIENT-LVL III: CPT | Mod: PBBFAC,,, | Performed by: ORTHOPAEDIC SURGERY

## 2020-03-03 PROCEDURE — 99999 PR PBB SHADOW E&M-EST. PATIENT-LVL III: ICD-10-PCS | Mod: PBBFAC,,, | Performed by: ORTHOPAEDIC SURGERY

## 2020-03-03 PROCEDURE — 99213 OFFICE O/P EST LOW 20 MIN: CPT | Mod: PBBFAC | Performed by: ORTHOPAEDIC SURGERY

## 2020-03-03 RX ORDER — METHYLPREDNISOLONE 4 MG/1
TABLET ORAL
Qty: 1 PACKAGE | Refills: 0 | Status: SHIPPED | OUTPATIENT
Start: 2020-03-03 | End: 2020-03-24

## 2020-03-03 RX ORDER — MELOXICAM 15 MG/1
15 TABLET ORAL DAILY
Qty: 30 TABLET | Refills: 0 | Status: SHIPPED | OUTPATIENT
Start: 2020-03-03 | End: 2020-04-03
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
## 2020-03-12 ENCOUNTER — TELEPHONE (OUTPATIENT)
Dept: ORTHOPEDICS | Facility: CLINIC | Age: 55
End: 2020-03-12

## 2020-03-12 NOTE — TELEPHONE ENCOUNTER
Left message for pt to call back regarding paperwork, need the correct fax number.        ----- Message from Mary Ambrose sent at 3/12/2020  9:54 AM CDT -----  Contact: pt   Stated he calling to see if his paperwork is complete, he can be reached at [phone] Thanks

## 2020-03-12 NOTE — TELEPHONE ENCOUNTER
Called pt to inform him that his paperwork is ready, he states he will come by later this afternoon and  the paperwork. Pt understood.         ----- Message from Ilene Bueno sent at 3/12/2020 10:15 AM CDT -----  Contact: pt   .Type:  Patient Returning Call    Who Called:BLANCA JUNG   Who Left Message for Patient: nurse   Does the patient know what this is regarding?:  Would the patient rather a call back or a response via [facility]? Call   Best Call Back Number: [phone] (home)   Additional Information:

## 2020-05-15 ENCOUNTER — PATIENT MESSAGE (OUTPATIENT)
Dept: ORTHOPEDICS | Facility: CLINIC | Age: 55
End: 2020-05-15

## 2020-05-15 ENCOUNTER — TELEPHONE (OUTPATIENT)
Dept: ORTHOPEDICS | Facility: CLINIC | Age: 55
End: 2020-05-15

## 2020-05-15 NOTE — TELEPHONE ENCOUNTER
Called pt to reschedule his apt with another provider pt understood.           ----- Message from Jasmin Gibbons sent at 5/15/2020 12:09 PM CDT -----  Contact: self/[phone] (M)  Patient is returning a phone call.  Who left a message for the patient: sara isaiah  Does patient know what this is regarding:  appt reschedule.  Comments:

## 2020-06-17 ENCOUNTER — OFFICE VISIT (OUTPATIENT)
Dept: ORTHOPEDICS | Facility: CLINIC | Age: 55
End: 2020-06-17
Payer: OTHER GOVERNMENT

## 2020-06-17 VITALS
HEART RATE: 60 BPM | DIASTOLIC BLOOD PRESSURE: 73 MMHG | SYSTOLIC BLOOD PRESSURE: 111 MMHG | HEIGHT: 74 IN | WEIGHT: 243 LBS | BODY MASS INDEX: 31.18 KG/M2

## 2020-06-17 DIAGNOSIS — M25.512 CHRONIC LEFT SHOULDER PAIN: ICD-10-CM

## 2020-06-17 DIAGNOSIS — M75.22 TENDONITIS OF UPPER BICEPS TENDON OF LEFT SHOULDER: ICD-10-CM

## 2020-06-17 DIAGNOSIS — M75.42 IMPINGEMENT SYNDROME OF LEFT SHOULDER: Primary | ICD-10-CM

## 2020-06-17 DIAGNOSIS — G89.29 CHRONIC LEFT SHOULDER PAIN: ICD-10-CM

## 2020-06-17 DIAGNOSIS — S43.432D SUPERIOR GLENOID LABRUM LESION OF LEFT SHOULDER, SUBSEQUENT ENCOUNTER: ICD-10-CM

## 2020-06-17 DIAGNOSIS — Z98.890 S/P SHOULDER SURGERY: ICD-10-CM

## 2020-06-17 PROCEDURE — 99999 PR PBB SHADOW E&M-EST. PATIENT-LVL III: CPT | Mod: PBBFAC,,, | Performed by: ORTHOPAEDIC SURGERY

## 2020-06-17 PROCEDURE — 99213 OFFICE O/P EST LOW 20 MIN: CPT | Mod: PBBFAC | Performed by: ORTHOPAEDIC SURGERY

## 2020-06-17 PROCEDURE — 99214 PR OFFICE/OUTPT VISIT, EST, LEVL IV, 30-39 MIN: ICD-10-PCS | Mod: S$PBB,,, | Performed by: ORTHOPAEDIC SURGERY

## 2020-06-17 PROCEDURE — 99214 OFFICE O/P EST MOD 30 MIN: CPT | Mod: S$PBB,,, | Performed by: ORTHOPAEDIC SURGERY

## 2020-06-17 PROCEDURE — 99999 PR PBB SHADOW E&M-EST. PATIENT-LVL III: ICD-10-PCS | Mod: PBBFAC,,, | Performed by: ORTHOPAEDIC SURGERY

## 2020-06-17 NOTE — PROGRESS NOTES
Patient ID: Colby Yepez  YOB: 1965  MRN: [id number]    Chief Complaint: Pain of the Left Shoulder    Referred By: previous patient of Dr. 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 Patton.    Shoulder Pain   The pain is present in the left shoulder. The current episode started more than 1 month ago. The problem occurs constantly. The problem has been gradually improving. The quality of the pain is described as aching and dull. The pain is at a severity of 4/10. Associated symptoms include numbness. Pertinent negatives include no fever or itching. The symptoms are aggravated by activity, bearing weight and exercise (push- ups, pull- ups, planks, reaching behind ). Physical therapy was effective (PT @ [facility] PT in [place]).      Past Medical History:   Past Medical History:   Diagnosis Date    Allergy     Hypotension, iatrogenic     Kidney stone     Liver cyst     Obesity     Thyroid disease      Past Surgical History:   Procedure Laterality Date    ARTHROSCOPIC DEBRIDEMENT OF SHOULDER Left 12/9/2019    Procedure: DEBRIDEMENT, SHOULDER, ARTHROSCOPIC;  Surgeon: Lenny Patton MD;  Location: [facility];  Service: Orthopedics;  Laterality: Left;    ARTHROSCOPY OF SHOULDER WITH DECOMPRESSION OF SUBACROMIAL SPACE Left 12/9/2019    Procedure: ARTHROSCOPY, SHOULDER, WITH SUBACROMIAL SPACE DECOMPRESSION;  Surgeon: Lenny Patton MD;  Location: [facility];  Service: Orthopedics;  Laterality: Left;    BICEPS TENDON REPAIR Left 12/9/2019    Procedure: REPAIR, TENDON, BICEPS;  Surgeon: Lenny  GEOFFREY Patton MD;  Location: [facility] OR;  Service: Orthopedics;  Laterality: Left;  arthroscopic bicep tenodesis    CHONDROPLASTY OF SHOULDER Left 12/9/2019    Procedure: CHONDROPLASTY, SHOULDER;  Surgeon: Lenny Ptaton MD;  Location: [facility] OR;  Service: Orthopedics;  Laterality: Left;  arthroscopic    COLONOSCOPY N/A 5/13/2019    Procedure: COLONOSCOPY;  Surgeon: Soha Norton MD;  Location: [facility] ENDO;  Service: Endoscopy;  Laterality: N/A;    EXAMINATION UNDER ANESTHESIA Left 12/9/2019    Procedure: EXAM UNDER ANESTHESIA;  Surgeon: Lenny Patton MD;  Location: [facility] OR;  Service: Orthopedics;  Laterality: Left;    GANGLION CYST EXCISION Left     INJECTION OF JOINT Left 7/25/2019    Procedure: Left shoulder Joint injection with local;  Surgeon: Leonardo Collado MD;  Location: [facility] PAIN MGT;  Service: Pain Management;  Laterality: Left;    SINUS SURGERY  2007    TONSILLECTOMY, ADENOIDECTOMY, BILATERAL MYRINGOTOMY AND TUBES      VASECTOMY  10/27/2017     Family History   Problem Relation Age of Onset    Hypertension Mother     Prostate cancer Father     Hyperlipidemia Father      Social History     Socioeconomic History    Marital status:      Spouse name: Not on file    Number of children: 3    Years of education: Not on file    Highest education level: Not on file   Occupational History     Employer:  [employer]   Social Needs    Financial resource strain: Not on file    Food insecurity     Worry: Not on file     Inability: Not on file    Transportation needs     Medical: Not on file     Non-medical: Not on file   Tobacco Use    Smoking status: Never Smoker    Smokeless tobacco: Never Used   Substance and Sexual Activity    Alcohol use: Yes     Frequency: 2-4 times a month     Drinks per session: 1 or 2     Binge frequency: Never     Comment: monthly    Drug use: No    Sexual activity: Not on file   Lifestyle    Physical activity     Days per week: Not on file     Minutes per  session: Not on file    Stress: Not on file   Relationships    Social connections     Talks on phone: Not on file     Gets together: Not on file     Attends Latter-day service: Not on file     Active member of club or organization: Not on file     Attends meetings of clubs or organizations: Not on file     Relationship status: Not on file   Other Topics Concern    Not on file   Social History Narrative    Not on file     Medication List with Changes/Refills   Current Medications    ASPIRIN (ECOTRIN) 81 MG EC TABLET    Take 1 tablet (81 mg total) by mouth once daily. With food    IBUPROFEN (ADVIL,MOTRIN) 800 MG TABLET    TAKE ONE TABLET BY MOUTH THREE TIMES DAILY AS NEEDED FOR PAIN    MULTIVITAMIN (ONE DAILY MULTIVITAMIN) PER TABLET    Take 1 tablet by mouth once daily. Hold 5 days prior to surgery    THYROID, PORK, (ARMOUR THYROID) 60 MG TAB    Take 60 mg by mouth before breakfast.    TIZANIDINE (ZANAFLEX) 4 MG TABLET    Take 1 tablet (4 mg total) by mouth 3 (three) times daily as needed.     Review of patient's allergies indicates:   Allergen Reactions    Tramadol Other (See Comments)     dizziness     Review of Systems   Constitution: Negative for fever.   HENT: Negative for sore throat.    Eyes: Negative for blurred vision.   Cardiovascular: Negative for dyspnea on exertion.   Respiratory: Negative for shortness of breath.    Hematologic/Lymphatic: Does not bruise/bleed easily.   Skin: Negative for itching.   Musculoskeletal: Positive for joint pain.   Gastrointestinal: Negative for vomiting.   Genitourinary: Negative for dysuria.   Neurological: Positive for numbness. Negative for dizziness.   Psychiatric/Behavioral: The patient does not have insomnia.        Physical Exam:   Body mass index is 31.2 kg/m².  Vitals:    06/17/20 0930   BP: 111/73   Pulse: 60      GENERAL: Well appearing, appropriate for stated age, no acute distress.  CARDIOVASCULAR: Pulses regular by peripheral palpation.  PULMONARY:  Respirations are even and non-labored.  NEURO: Awake, alert, and oriented x 3.  PSYCH: Mood & affect are appropriate.  HEENT: Head is normocephalic and atraumatic.          Right Shoulder Exam   Right shoulder exam is normal.    Range of Motion   Forward Elevation: 180  External Rotation 0 degrees: 70   Internal rotation 0 degrees: T10     Other   Sensation: normal    Left Shoulder Exam     Inspection/Observation   Scars: present  Scapular Dyskinesia: positive    Tenderness   The patient is tender to palpation of the biceps tendon.    Range of Motion   Forward Elevation: 150  External Rotation 0 degrees: 40   Internal rotation 0 degrees: T10     Tests & Signs   Impingement: positive  Rotator Cuff Painful Arc/Range: mild  Active Compression Test (Munith's Sign): positive  Speed's Test: positive    Other   Sensation: normal       Muscle Strength   Right Upper Extremity   Shoulder Internal Rotation: 5/5   Shoulder External Rotation: 5/5   Supraspinatus: 5/5/5   Subscapularis: 5/5/5   Biceps: 5/5/5   Left Upper Extremity  Shoulder Internal Rotation: 4/5   Shoulder External Rotation: 4/5   Supraspinatus: 4/5/5   Biceps: 4/5/5     Vascular Exam       Left Pulses      Radial:                    2+      Capillary Refill  Right Hand: normal capillary refill  Left Hand: normal capillary refill        Imaging:    FL Fluoro for Pain Management  See OP Notes for results.     IMPRESSION: See OP Notes for results.     This procedure was auto-finalized by: Virtual Radiologist    Relevant imaging results reviewed and interpreted by me, discussed with the patient and / or family today.     Other Tests:     No other tests performed today.    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 Patton   Some persistent pain and ROM limitations    Encounter Diagnoses   Name Primary?    Impingement syndrome of left shoulder Yes    Chronic left shoulder pain     Superior glenoid labrum lesion of left shoulder, subsequent encounter     Tendonitis of upper biceps tendon of left shoulder     S/P shoulder surgery         Plan:   OK to return to full duty as tolerated - needs work note   Continue NSAIDs as needed (taking Advil currently)   Voltaren cream - BID prn pain to area of greatest tenderness   Physical therapy at [facility] in [place] (location) - work on strengthening, end range of motion, and pain control modalities   Treatment plan was developed with input from the patient/family, and they expressed understanding and agreement with the plan. All questions were answered today.    Follow-up: Kate Longo PA-C in 3 months or sooner if there are any problems between now and then.    Disclaimer: This note was prepared using a voice recognition system and is likely to have sound alike errors within the text.

## 2020-06-17 NOTE — PATIENT INSTRUCTIONS
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 Patton   Some persistent pain and ROM limitations    Encounter Diagnoses   Name Primary?    Impingement syndrome of left shoulder Yes    Chronic left shoulder pain     Superior glenoid labrum lesion of left shoulder, subsequent encounter     Tendonitis of upper biceps tendon of left shoulder     S/P shoulder surgery         Plan:   OK to return to full duty as tolerated - needs work note   Continue NSAIDs as needed (taking Advil currently)   Voltaren cream - BID prn pain to area of greatest tenderness   Physical therapy at [facility] in [place] (location) - work on strengthening, end range of motion, and pain control modalities   Treatment plan was developed with input from the patient/family, and they expressed understanding and agreement with the plan. All questions were answered today.    Follow-up: Kate Longo PA-C in 3 months or sooner if there are any problems between now and then.    Thank you for choosing [facility] and Dr. Femi Huynh for your orthopedic & sports medicine care. It is our goal to provide you with exceptional care that will help keep you healthy, active, and get you back in the game.    If you felt that you received exemplary care today, please consider leaving us feedback on Healthgrades at [email or web address]     Please do not hesitate to reach out to us via email, phone, or MyChart with any questions, concerns, or feedback.    If you are experiencing pain/discomfort ,or have questions after  5pm and would like to be connected to the [facility] on-call team, please call this number and specify which Sports Medicine provider is treating you: [phone]

## 2020-06-18 RX ORDER — DICLOFENAC SODIUM 10 MG/G
2 GEL TOPICAL 2 TIMES DAILY
Qty: 1 TUBE | Refills: 0 | Status: SHIPPED | OUTPATIENT
Start: 2020-06-18 | End: 2021-11-17

## 2020-06-19 ENCOUNTER — TELEPHONE (OUTPATIENT)
Dept: ORTHOPEDICS | Facility: CLINIC | Age: 55
End: 2020-06-19

## 2020-06-19 ENCOUNTER — DOCUMENTATION ONLY (OUTPATIENT)
Dept: ORTHOPEDICS | Facility: CLINIC | Age: 55
End: 2020-06-19

## 2020-06-19 NOTE — TELEPHONE ENCOUNTER
Spoke with pt who was last seen 2 days ago when this PT order was placed. He states that he discussed external PT order with , and due to his injury occurring on duty, the authorization has to come from our office. Informed the pt that I would contact our Pre-service department ASAP to get this worked on. Pt expressed verbal understanding and was thankful for the returned call.     Message sent via [employer] Teams to Jim Colon in Pre-service to help obtain authorization for external PT for pt, who confirmed receipt of message, AL, LPN.     ----- Message from Flaca Higuera sent at 6/19/2020 11:27 AM CDT -----  Regarding: Self/[phone]/  (3 attempt)  Patient called in regards needing to talk with someone in the office about the wording in the prescription, and . Patient stated that he has been trying to reach someone in the office for the past 3 days and not answer at all. Please call and advise. Thank you.

## 2020-06-19 NOTE — PROGRESS NOTES
Faxed PT referral again, along with authorization obtained by our referrals department to [phone], with confirmation received, JC CHOW

## 2020-07-14 ENCOUNTER — TELEPHONE (OUTPATIENT)
Dept: ORTHOPEDICS | Facility: CLINIC | Age: 55
End: 2020-07-14

## 2020-08-17 ENCOUNTER — TELEPHONE (OUTPATIENT)
Dept: ORTHOPEDICS | Facility: CLINIC | Age: 55
End: 2020-08-17

## 2020-08-17 NOTE — TELEPHONE ENCOUNTER
Spoke w/ patient in regards to getting a sooner appt. Offered patient 8/19. Patient agreed and was notified of time date and location. Patient verbalized understanding and was grateful for the call-DD        ----- Message from Thelma Bradford sent at 8/17/2020  3:04 PM CDT -----  Type:  Sooner Apoointment Request    Caller is requesting a sooner appointment.  Caller declined first available appointment listed below.  Caller will not accept being placed on the waitlist and is requesting a message be sent to doctor.  Name of Caller:Colby Yepez  When is the first available appointment? Patient is scheduled for 9/17/20  Symptoms: LT Shoulder pain   Would the patient rather a call back or a response via [facility]? Call back   Best Call Back Number: [phone]  Additional Information: Patient is requesting to move his appointment from 9/17/20 with Kate Longo PA-C at  the [facility]. To something much sooner. Patient is requesting for either this week or next week. If possible.       Patient is stating that he is opened to anytime slot.

## 2020-08-19 ENCOUNTER — PROCEDURE VISIT (OUTPATIENT)
Dept: ORTHOPEDICS | Facility: CLINIC | Age: 55
End: 2020-08-19
Payer: OTHER GOVERNMENT

## 2020-08-19 ENCOUNTER — TELEPHONE (OUTPATIENT)
Dept: ORTHOPEDICS | Facility: CLINIC | Age: 55
End: 2020-08-19

## 2020-08-19 ENCOUNTER — HOSPITAL ENCOUNTER (OUTPATIENT)
Dept: RADIOLOGY | Facility: HOSPITAL | Age: 55
Discharge: HOME | End: 2020-08-19
Attending: PHYSICIAN ASSISTANT
Payer: OTHER GOVERNMENT

## 2020-08-19 VITALS
SYSTOLIC BLOOD PRESSURE: 114 MMHG | BODY MASS INDEX: 31.18 KG/M2 | WEIGHT: 243 LBS | DIASTOLIC BLOOD PRESSURE: 74 MMHG | HEART RATE: 63 BPM | HEIGHT: 74 IN

## 2020-08-19 DIAGNOSIS — M25.519 SHOULDER PAIN, UNSPECIFIED CHRONICITY, UNSPECIFIED LATERALITY: ICD-10-CM

## 2020-08-19 DIAGNOSIS — M25.812 SHOULDER IMPINGEMENT, LEFT: Primary | ICD-10-CM

## 2020-08-19 DIAGNOSIS — M25.519 SHOULDER PAIN, UNSPECIFIED CHRONICITY, UNSPECIFIED LATERALITY: Primary | ICD-10-CM

## 2020-08-19 PROCEDURE — 20610 LARGE JOINT ASPIRATION/INJECTION: L SUBACROMIAL BURSA: ICD-10-PCS | Mod: S$PBB,LT,, | Performed by: PHYSICIAN ASSISTANT

## 2020-08-19 PROCEDURE — 73030 XR SHOULDER COMPLETE 2 OR MORE VIEWS LEFT: ICD-10-PCS | Mod: 26,LT,, | Performed by: RADIOLOGY

## 2020-08-19 PROCEDURE — 73030 X-RAY EXAM OF SHOULDER: CPT | Mod: 26,LT,, | Performed by: RADIOLOGY

## 2020-08-19 PROCEDURE — 20610 DRAIN/INJ JOINT/BURSA W/O US: CPT | Mod: PBBFAC | Performed by: PHYSICIAN ASSISTANT

## 2020-08-19 PROCEDURE — 73030 X-RAY EXAM OF SHOULDER: CPT | Mod: TC,LT

## 2020-08-19 PROCEDURE — 20610 DRAIN/INJ JOINT/BURSA W/O US: CPT | Mod: S$PBB,LT,, | Performed by: PHYSICIAN ASSISTANT

## 2020-08-19 RX ORDER — METHYLPREDNISOLONE ACETATE 80 MG/ML
80 INJECTION, SUSPENSION INTRA-ARTICULAR; INTRALESIONAL; INTRAMUSCULAR; SOFT TISSUE
Status: DISCONTINUED | OUTPATIENT
Start: 2020-08-19 | End: 2020-08-19 | Stop reason: HOSPADM

## 2020-08-19 RX ADMIN — METHYLPREDNISOLONE ACETATE 80 MG: 80 INJECTION, SUSPENSION INTRALESIONAL; INTRAMUSCULAR; INTRASYNOVIAL; SOFT TISSUE at 09:08

## 2020-08-19 NOTE — PROCEDURES
Large Joint Aspiration/Injection: L subacromial bursa    Date/Time: 8/19/2020 9:00 AM  Performed by: Kate Longo PA-C  Authorized by: Kate Longo PA-C     Consent Done?:  Yes (Verbal)  Indications:  Pain and diagnostic evaluation  Site marked: the procedure site was marked    Timeout: prior to procedure the correct patient, procedure, and site was verified      Local anesthesia used?: Yes    Anesthesia:  Local infiltration  Local anesthetic:  Bupivacaine 0.25% without epinephrine, lidocaine 1% without epinephrine and topical anesthetic    Details:  Needle Size:  22 G  Ultrasonic Guidance for needle placement?: No    Approach:  Posterior  Location:  Shoulder  Site:  L subacromial bursa  Medications:  80 mg methylPREDNISolone acetate 80 mg/mL  Patient tolerance:  Patient tolerated the procedure well with no immediate complications     2cc 1% lidocaine plain, 2cc 0.5% marcaine plain, 0.5cc 80mg methylprednisolone

## 2020-08-19 NOTE — PROGRESS NOTES
Patient ID: Colby Yepez  YOB: 1965  MRN: [id number]    Chief Complaint: Pain of the Left Shoulder    Referred By:  previous patient of Dr. 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 Patton.        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 Patton.     Shoulder Pain       Shoulder Pain   The pain is present in the left shoulder. This is a recurrent problem. The current episode started more than 1 year ago. The problem occurs constantly. The problem has been gradually worsening. The quality of the pain is described as aching, sharp, shooting, squeezing, tightness, tight, throbbing and tingling. The pain is at a severity of 5/10. Associated symptoms include joint locking, joint swelling, a limited range of motion and stiffness. Pertinent negatives include no fever, itching or numbness. The symptoms are aggravated by activity, walking, lifting, lying down and standing. He has tried other, OTC pain meds, NSAIDs, movement and rest for the symptoms. The treatment provided mild relief. Physical therapy was ineffective.      Past Medical History:   Past Medical History:   Diagnosis Date     Allergy     Hypotension, iatrogenic     Kidney stone     Liver cyst     Obesity     Thyroid disease      Past Surgical History:   Procedure Laterality Date    ARTHROSCOPIC DEBRIDEMENT OF SHOULDER Left 12/9/2019    Procedure: DEBRIDEMENT, SHOULDER, ARTHROSCOPIC;  Surgeon: Lenny Patton MD;  Location: [facility] OR;  Service: Orthopedics;  Laterality: Left;    ARTHROSCOPY OF SHOULDER WITH DECOMPRESSION OF SUBACROMIAL SPACE Left 12/9/2019    Procedure: ARTHROSCOPY, SHOULDER, WITH SUBACROMIAL SPACE DECOMPRESSION;  Surgeon: Lenny Patton MD;  Location: [facility] OR;  Service: Orthopedics;  Laterality: Left;    BICEPS TENDON REPAIR Left 12/9/2019    Procedure: REPAIR, TENDON, BICEPS;  Surgeon: Lenny Patton MD;  Location: [facility] OR;  Service: Orthopedics;  Laterality: Left;  arthroscopic bicep tenodesis    CHONDROPLASTY OF SHOULDER Left 12/9/2019    Procedure: CHONDROPLASTY, SHOULDER;  Surgeon: Lenny Patton MD;  Location: [facility] OR;  Service: Orthopedics;  Laterality: Left;  arthroscopic    COLONOSCOPY N/A 5/13/2019    Procedure: COLONOSCOPY;  Surgeon: Soha Norton MD;  Location: [facility] ENDO;  Service: Endoscopy;  Laterality: N/A;    EXAMINATION UNDER ANESTHESIA Left 12/9/2019    Procedure: EXAM UNDER ANESTHESIA;  Surgeon: Lenny Patton MD;  Location: [facility] OR;  Service: Orthopedics;  Laterality: Left;    GANGLION CYST EXCISION Left     INJECTION OF JOINT Left 7/25/2019    Procedure: Left shoulder Joint injection with local;  Surgeon: Leonardo Collado MD;  Location: [facility] PAIN MGT;  Service: Pain Management;  Laterality: Left;    SINUS SURGERY  2007    TONSILLECTOMY, ADENOIDECTOMY, BILATERAL MYRINGOTOMY AND TUBES      VASECTOMY  10/27/2017     Family History   Problem Relation Age of Onset    Hypertension Mother     Prostate cancer Father     Hyperlipidemia Father      Social History     Socioeconomic History    Marital status:      Spouse name: Not on file    Number of children: 3     Years of education: Not on file    Highest education level: Not on file   Occupational History     Employer: [employer]   Social Needs    Financial resource strain: Not on file    Food insecurity     Worry: Not on file     Inability: Not on file    Transportation needs     Medical: Not on file     Non-medical: Not on file   Tobacco Use    Smoking status: Never Smoker    Smokeless tobacco: Never Used   Substance and Sexual Activity    Alcohol use: Yes     Frequency: 2-4 times a month     Drinks per session: 1 or 2     Binge frequency: Never     Comment: monthly    Drug use: No    Sexual activity: Not on file   Lifestyle    Physical activity     Days per week: Not on file     Minutes per session: Not on file    Stress: Not on file   Relationships    Social connections     Talks on phone: Not on file     Gets together: Not on file     Attends Jehovah's witness service: Not on file     Active member of club or organization: Not on file     Attends meetings of clubs or organizations: Not on file     Relationship status: Not on file   Other Topics Concern    Not on file   Social History Narrative    Not on file     Medication List with Changes/Refills   Current Medications    ASPIRIN (ECOTRIN) 81 MG EC TABLET    Take 1 tablet (81 mg total) by mouth once daily. With food    DICLOFENAC SODIUM (VOLTAREN) 1 % GEL    Apply 2 g topically 2 (two) times daily.    IBUPROFEN (ADVIL,MOTRIN) 800 MG TABLET    TAKE ONE TABLET BY MOUTH THREE TIMES DAILY AS NEEDED FOR PAIN    MULTIVITAMIN (ONE DAILY MULTIVITAMIN) PER TABLET    Take 1 tablet by mouth once daily. Hold 5 days prior to surgery    THYROID, PORK, (ARMOUR THYROID) 60 MG TAB    Take 60 mg by mouth before breakfast.    TIZANIDINE (ZANAFLEX) 4 MG TABLET    Take 1 tablet (4 mg total) by mouth 3 (three) times daily as needed.     Review of patient's allergies indicates:   Allergen Reactions    Tramadol Other (See Comments)     dizziness     Review of Systems    Constitution: Negative. Negative for chills and fever.   HENT: Negative.  Negative for sore throat.    Eyes: Negative.  Negative for pain.   Cardiovascular: Negative.  Negative for chest pain and leg swelling.   Respiratory: Negative.  Negative for cough and shortness of breath.    Endocrine: Negative.  Negative for polyuria.   Hematologic/Lymphatic: Negative for bleeding problem.   Skin: Negative.  Negative for itching and rash.   Musculoskeletal: Positive for joint pain, joint swelling, muscle weakness, neck pain and stiffness. Negative for back pain and muscle cramps.   Gastrointestinal: Negative.  Negative for abdominal pain, nausea and vomiting.   Genitourinary: Negative.  Negative for dysuria and hematuria.   Neurological: Negative.  Negative for dizziness, loss of balance, numbness and paresthesias.   Psychiatric/Behavioral: Negative.  Negative for altered mental status.   Allergic/Immunologic: Negative.        Physical Exam:   Body mass index is 31.2 kg/m².  Vitals:    08/19/20 0920   BP: 114/74   Pulse: 63      GENERAL: Well appearing, appropriate for stated age, no acute distress.  CARDIOVASCULAR: Pulses regular by peripheral palpation.  PULMONARY: Respirations are even and non-labored.  NEURO: Awake, alert, and oriented x 3.  PSYCH: Mood & affect are appropriate.  HEENT: Head is normocephalic and atraumatic.  General    Nursing note and vitals reviewed.        Right Shoulder Exam   Right shoulder exam is normal.    Range of Motion   Active abduction: 110   Forward Flexion: 140   External Rotation 0 degrees: 50   Internal rotation 0 degrees: T10     Tests & Signs   Cross arm: negative  Collier test: negative  Impingement: negative  Active Compression Test (Driftwood's Sign): negative  Yergason's Test: negative  Speed's Test: negative    Other   Sensation: normal    Left Shoulder Exam     Tenderness   The patient is tender to palpation of the greater tuberosity and biceps tendon (rotator cuff  insertion).    Range of Motion   Active abduction: 80   Forward Flexion: 100   External Rotation 0 degrees: 30   Internal rotation 0 degrees: Sacrum     Tests & Signs   Cross arm: positive  Collier test: positive  Impingement: positive  Rotator Cuff Painful Arc/Range: moderate  Yergasons's Test: negative  Speed's Test: negative    Other   Sensation: normal       Muscle Strength   Right Upper Extremity   Shoulder Abduction: 5/5   Shoulder Internal Rotation: 5/5   Shoulder External Rotation: 5/5   Supraspinatus: 5/5/5   Subscapularis: 5/5/5   Biceps: 5/5/5   Left Upper Extremity  Shoulder Abduction: 4/5   Shoulder Internal Rotation: 5/5   Shoulder External Rotation: 4/5   Supraspinatus: 4/5/5   Subscapularis: 5/5/5   Biceps: 5/5/5     Vascular Exam     Right Pulses      Radial:                    2+      Left Pulses      Radial:                    2+      Capillary Refill  Right Hand: normal capillary refill  Left Hand: normal capillary refill    Intact extensor pollicis longus, flexor pollicis longus, finger flexion, finger extension, finger abduction and adduction.   Sensation intact to radial, median, ulnar, and axillary nerve distributions.   Hand warm and well perfused with capillary refill of less than 2 seconds, and palpable distal radial pulses.      Imaging:    X-Ray Shoulder 2 or More Views Left  Narrative: EXAMINATION:  XR SHOULDER COMPLETE 2 OR MORE VIEWS LEFT    CLINICAL HISTORY:  Pain in unspecified shoulder    TECHNIQUE:  Two or three views of the left shoulder were performed.    COMPARISON:  03/27/2019    FINDINGS:  No fracture or dislocation.  Joint spaces maintained with minimal AC joint degenerative findings.  Well-defined 8 mm lucency noted within the proximal left humerus.  Correlate for any prior surgery.  Lung parenchyma clear.  Impression: As above    Electronically signed by: Shahram Khoury MD  Date:    08/19/2020  Time:    10:32    New radiographic images obtained today which correlate  with consistent finds of prior sugery.   Relevant imaging results reviewed and interpreted by me, discussed with the patient and / or family today.     Other Tests:     No other tests performed today.   This is my first time seeing  he states that prior to his last visit his range of motion has improved he is still having symptoms consistent with impingement. At this time I would like him to have a CSI to help decrease pain and help improve his range of motion. Overall I think he is doing better but would like to see him imrove more as this is affecting his job.     Assessment:  Colby Yepez is a 55 y.o. 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 Patton   Some persistent pain and ROM limitations   Left shoulder impingement      Encounter Diagnosis   Name Primary?    Shoulder impingement, left Yes        Plan:  · Continue physical therapy at [facility] PT- new prescription   · X-rays of left shoulder on way out.  · Left shoulder subacromial injection 2/2/40  · Recheck in 6 weeks  · I discussed worrisome and red flag signs and symptoms with the patient. The patient expressed understanding and agreed to alert me immediately or to go to the emergency room if they experience any of these.    Treatment plan was developed with input from the patient/family, and they expressed understanding and agreement with the plan. All questions were answered today.    Follow-up: 6 weeks or sooner if there are any problems between now and then.    Disclaimer: This note was prepared using a voice recognition system and is likely to have sound alike errors within the text.

## 2020-08-19 NOTE — PROGRESS NOTES
Patient ID: Colby Yepez  YOB: 1965  MRN: [id number]    Chief Complaint: Pain of the Left Shoulder    Referred By: previous patient of Dr. 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 Patton.        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 Patton.     Shoulder Pain   The pain is present in the left shoulder. This is a recurrent problem. The current episode started more than 1 year ago. The problem occurs intermittently and constantly. The problem has been gradually worsening. The quality of the pain is described as aching, throbbing, squeezing, tightness, tight, shooting and tingling. The pain is at a severity of 5/10. Associated symptoms include joint locking, joint swelling, a limited range of motion and stiffness. Pertinent negatives include no fever or numbness. The symptoms are aggravated by activity, flexion, lying down and lifting. He has tried other, OTC pain meds, NSAIDs and movement for the symptoms. The treatment provided mild relief. Physical therapy was ineffective.      Past Medical History:   Past Medical History:   Diagnosis Date    Allergy     Hypotension,  iatrogenic     Kidney stone     Liver cyst     Obesity     Thyroid disease      Past Surgical History:   Procedure Laterality Date    ARTHROSCOPIC DEBRIDEMENT OF SHOULDER Left 12/9/2019    Procedure: DEBRIDEMENT, SHOULDER, ARTHROSCOPIC;  Surgeon: Lenny Patton MD;  Location: [facility] OR;  Service: Orthopedics;  Laterality: Left;    ARTHROSCOPY OF SHOULDER WITH DECOMPRESSION OF SUBACROMIAL SPACE Left 12/9/2019    Procedure: ARTHROSCOPY, SHOULDER, WITH SUBACROMIAL SPACE DECOMPRESSION;  Surgeon: Lenny Patton MD;  Location: [facility] OR;  Service: Orthopedics;  Laterality: Left;    BICEPS TENDON REPAIR Left 12/9/2019    Procedure: REPAIR, TENDON, BICEPS;  Surgeon: Lenny Patton MD;  Location: [facility] OR;  Service: Orthopedics;  Laterality: Left;  arthroscopic bicep tenodesis    CHONDROPLASTY OF SHOULDER Left 12/9/2019    Procedure: CHONDROPLASTY, SHOULDER;  Surgeon: Lenny Patton MD;  Location: [facility] OR;  Service: Orthopedics;  Laterality: Left;  arthroscopic    COLONOSCOPY N/A 5/13/2019    Procedure: COLONOSCOPY;  Surgeon: Soha Norton MD;  Location: [facility] ENDO;  Service: Endoscopy;  Laterality: N/A;    EXAMINATION UNDER ANESTHESIA Left 12/9/2019    Procedure: EXAM UNDER ANESTHESIA;  Surgeon: Lenny Patton MD;  Location: [facility] OR;  Service: Orthopedics;  Laterality: Left;    GANGLION CYST EXCISION Left     INJECTION OF JOINT Left 7/25/2019    Procedure: Left shoulder Joint injection with local;  Surgeon: Leonardo Collado MD;  Location: [facility] PAIN MGT;  Service: Pain Management;  Laterality: Left;    SINUS SURGERY  2007    TONSILLECTOMY, ADENOIDECTOMY, BILATERAL MYRINGOTOMY AND TUBES      VASECTOMY  10/27/2017     Family History   Problem Relation Age of Onset    Hypertension Mother     Prostate cancer Father     Hyperlipidemia Father      Social History     Socioeconomic History    Marital status:      Spouse name: Not on file    Number of children: 3    Years of education:  Not on file    Highest education level: Not on file   Occupational History     Employer:  [employer]   Social Needs    Financial resource strain: Not on file    Food insecurity     Worry: Not on file     Inability: Not on file    Transportation needs     Medical: Not on file     Non-medical: Not on file   Tobacco Use    Smoking status: Never Smoker    Smokeless tobacco: Never Used   Substance and Sexual Activity    Alcohol use: Yes     Frequency: 2-4 times a month     Drinks per session: 1 or 2     Binge frequency: Never     Comment: monthly    Drug use: No    Sexual activity: Not on file   Lifestyle    Physical activity     Days per week: Not on file     Minutes per session: Not on file    Stress: Not on file   Relationships    Social connections     Talks on phone: Not on file     Gets together: Not on file     Attends Shinto service: Not on file     Active member of club or organization: Not on file     Attends meetings of clubs or organizations: Not on file     Relationship status: Not on file   Other Topics Concern    Not on file   Social History Narrative    Not on file     Medication List with Changes/Refills   Current Medications    ASPIRIN (ECOTRIN) 81 MG EC TABLET    Take 1 tablet (81 mg total) by mouth once daily. With food    DICLOFENAC SODIUM (VOLTAREN) 1 % GEL    Apply 2 g topically 2 (two) times daily.    IBUPROFEN (ADVIL,MOTRIN) 800 MG TABLET    TAKE ONE TABLET BY MOUTH THREE TIMES DAILY AS NEEDED FOR PAIN    MULTIVITAMIN (ONE DAILY MULTIVITAMIN) PER TABLET    Take 1 tablet by mouth once daily. Hold 5 days prior to surgery    THYROID, PORK, (ARMOUR THYROID) 60 MG TAB    Take 60 mg by mouth before breakfast.    TIZANIDINE (ZANAFLEX) 4 MG TABLET    Take 1 tablet (4 mg total) by mouth 3 (three) times daily as needed.     Review of patient's allergies indicates:   Allergen Reactions    Tramadol Other (See Comments)     dizziness     Review of Systems   Constitution: Negative.  Negative for chills and fever.   HENT: Negative.  Negative for ear discharge and hearing loss.    Eyes: Negative.  Negative for blurred vision and visual disturbance.   Cardiovascular: Negative.  Negative for chest pain and leg swelling.   Respiratory: Negative.  Negative for cough and shortness of breath.    Endocrine: Negative.  Negative for polyuria.   Hematologic/Lymphatic: Negative for bleeding problem.   Skin: Negative.  Negative for rash.   Musculoskeletal: Positive for joint pain, muscle weakness and stiffness. Negative for back pain, joint swelling and muscle cramps.   Gastrointestinal: Negative.  Negative for nausea and vomiting.   Genitourinary: Negative.  Negative for hematuria.   Neurological: Negative.  Negative for loss of balance, numbness and paresthesias.   Psychiatric/Behavioral: Negative.  Negative for altered mental status.   Allergic/Immunologic: Negative.        Physical Exam:   Body mass index is 31.2 kg/m².  Vitals:    08/19/20 0920   BP: 114/74   Pulse: 63      GENERAL: Well appearing, appropriate for stated age, no acute distress.  CARDIOVASCULAR: Pulses regular by peripheral palpation.  PULMONARY: Respirations are even and non-labored.  NEURO: Awake, alert, and oriented x 3.  PSYCH: Mood & affect are appropriate.  HEENT: Head is normocephalic and atraumatic.  Ortho/SPM Exam  ***    Imaging:    FL Fluoro for Pain Management  See OP Notes for results.     IMPRESSION: See OP Notes for results.     This procedure was auto-finalized by: Virtual Radiologist    ***  Relevant imaging results reviewed and interpreted by me, discussed with the patient and / or family today. ***    Other Tests:     No other tests performed today.***    Assessment:  Colby Yepez is a 55 y.o. male ***   ***    No diagnosis found.     Plan:   ***   Treatment plan was developed with input from the patient/family, and they expressed understanding and agreement with the plan. All questions were answered  today.    Follow-up: *** or sooner if there are any problems between now and then.    Disclaimer: This note was prepared using a voice recognition system and is likely to have sound alike errors within the text.

## 2020-08-19 NOTE — PATIENT INSTRUCTIONS
Assessment:  Colby Yepez is a 55 y.o. 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 Patton   Some persistent pain and ROM limitations    No diagnosis found.     Plan:  · Continue physical therapy at [facility] PT- new prescription   · X-rays of left shoulder on way out.  · Left shoulder subacromial injection 2/2/40  · Recheck in 6 weeks    Follow-up: 6 weeks  or sooner if there are any problems between now and then.    Thank you for choosing [facility] and Dr. Femi Huynh for your orthopedic & sports medicine care. It is our goal to provide you with exceptional care that will help keep you healthy, active, and get you back in the game.    If you felt that you received exemplary care today, please consider leaving us feedback on [email or web address] at [email or web address].     Please do not hesitate to reach out to us via email, phone, or MyChart with any questions, concerns, or feedback.    If you are experiencing pain/discomfort ,or have questions after 5pm and would like to be connected to the [facility] on-call team, please call this number and specify which Sports Medicine provider is treating you: [phone]

## 2020-09-11 ENCOUNTER — TELEPHONE (OUTPATIENT)
Dept: ORTHOPEDICS | Facility: CLINIC | Age: 55
End: 2020-09-11

## 2020-09-30 ENCOUNTER — OFFICE VISIT (OUTPATIENT)
Dept: ORTHOPEDICS | Facility: CLINIC | Age: 55
End: 2020-09-30
Payer: OTHER GOVERNMENT

## 2020-09-30 VITALS
DIASTOLIC BLOOD PRESSURE: 71 MMHG | HEART RATE: 69 BPM | SYSTOLIC BLOOD PRESSURE: 109 MMHG | WEIGHT: 243 LBS | BODY MASS INDEX: 31.18 KG/M2 | HEIGHT: 74 IN

## 2020-09-30 DIAGNOSIS — M25.812 SHOULDER IMPINGEMENT, LEFT: Primary | ICD-10-CM

## 2020-09-30 DIAGNOSIS — R52 PAIN: ICD-10-CM

## 2020-09-30 DIAGNOSIS — M75.42 IMPINGEMENT SYNDROME OF LEFT SHOULDER: ICD-10-CM

## 2020-09-30 DIAGNOSIS — M75.22 TENDONITIS OF UPPER BICEPS TENDON OF LEFT SHOULDER: ICD-10-CM

## 2020-09-30 DIAGNOSIS — Z98.890 S/P SHOULDER SURGERY: ICD-10-CM

## 2020-09-30 PROCEDURE — 99999 PR PBB SHADOW E&M-EST. PATIENT-LVL III: CPT | Mod: PBBFAC,,, | Performed by: PHYSICIAN ASSISTANT

## 2020-09-30 PROCEDURE — 99213 PR OFFICE/OUTPT VISIT, EST, LEVL III, 20-29 MIN: ICD-10-PCS | Mod: S$PBB,,, | Performed by: PHYSICIAN ASSISTANT

## 2020-09-30 PROCEDURE — 99213 OFFICE O/P EST LOW 20 MIN: CPT | Mod: PBBFAC | Performed by: PHYSICIAN ASSISTANT

## 2020-09-30 PROCEDURE — 99999 PR PBB SHADOW E&M-EST. PATIENT-LVL III: ICD-10-PCS | Mod: PBBFAC,,, | Performed by: PHYSICIAN ASSISTANT

## 2020-09-30 PROCEDURE — 99213 OFFICE O/P EST LOW 20 MIN: CPT | Mod: S$PBB,,, | Performed by: PHYSICIAN ASSISTANT

## 2020-09-30 NOTE — PATIENT INSTRUCTIONS
Assessment:  Colby Yepez is a 55 y.o. male 8 months s/p Left shoulder exam under anesthesia, Left shoulder arthroscopy with extensive arthroscopic labral debridement, Left shoulder arthroscopic chondroplasty glenoid - inferior, Left shoulder arthroscopic subacromial decompression, Left shoulder arthroscopic assisted biceps tenodesis (12/9/2019) with Dr. Patton. He is here today for a follow up for his left shoulder.    Persistent pain and ROM limitations    Left shoulder impingement    Left shoulder biceps tendonitis   Left shoulder AC joint arthropathy    Encounter Diagnoses   Name Primary?    Shoulder impingement, left Yes    Impingement syndrome of left shoulder     Tendonitis of upper biceps tendon of left shoulder     S/P shoulder surgery     Pain         Plan:  · Continue physical therapy [place]   · Follow up for Left shoulder AC joint injection with Dr. Huynh   · Fill out paperwork for the patient for the    ·   Follow-up: Left shoulder AC joint injection with Dr. Huynh or sooner if there are any problems between now and then.    Thank you for choosing [facility] and Dr. Femi Huynh for your orthopedic & sports medicine care. It is our goal to provide you with exceptional care that will help keep you healthy, active, and get you back in the game.    If you felt that you received exemplary care today, please consider leaving us feedback on [employer] at [email or web address].     Please do not hesitate to reach out to us via email, phone, or MyChart with any questions, concerns, or feedback.    If you are experiencing pain/discomfort ,or have questions after 5pm and would like to be connected to the [facility] on-call team, please call this number and specify which Sports Medicine provider is treating you: [phone]

## 2020-10-02 ENCOUNTER — TELEPHONE (OUTPATIENT)
Dept: ORTHOPEDICS | Facility: CLINIC | Age: 55
End: 2020-10-02

## 2020-10-07 ENCOUNTER — PROCEDURE VISIT (OUTPATIENT)
Dept: ORTHOPEDICS | Facility: CLINIC | Age: 55
End: 2020-10-07
Payer: OTHER GOVERNMENT

## 2020-10-07 VITALS
DIASTOLIC BLOOD PRESSURE: 77 MMHG | HEART RATE: 66 BPM | BODY MASS INDEX: 31.18 KG/M2 | HEIGHT: 74 IN | WEIGHT: 243 LBS | SYSTOLIC BLOOD PRESSURE: 117 MMHG

## 2020-10-07 DIAGNOSIS — M25.519 SHOULDER PAIN, UNSPECIFIED CHRONICITY, UNSPECIFIED LATERALITY: Primary | ICD-10-CM

## 2020-10-07 PROCEDURE — 20606 DRAIN/INJ JOINT/BURSA W/US: CPT | Mod: PBBFAC | Performed by: ORTHOPAEDIC SURGERY

## 2020-10-07 PROCEDURE — 20606 INTERMEDIATE JOINT ASPIRATION/INJECTION: L ACROMIOCLAVICULAR: ICD-10-PCS | Mod: S$PBB,LT,, | Performed by: ORTHOPAEDIC SURGERY

## 2020-10-07 RX ADMIN — METHYLPREDNISOLONE ACETATE 40 MG: 80 INJECTION, SUSPENSION INTRALESIONAL; INTRAMUSCULAR; INTRASYNOVIAL; SOFT TISSUE at 10:10

## 2020-10-09 NOTE — PROCEDURES
Intermediate Joint Aspiration/Injection: L acromioclavicular    Date/Time: 10/7/2020 10:30 AM  Performed by: Femi Huynh MD  Authorized by: Femi Huynh MD     Consent Done?: Yes (Verbal)  Indications: Arthritis and pain  Site marked: The procedure site was marked    Timeout: Prior to procedure the correct patient, procedure, and site was verified      Location:  Shoulder  Site:  L acromioclavicular  Prep: Patient was prepped and draped in usual sterile fashion    Ultrasonic Guidance for needle placement: Yes  Images are saved and documented.  Needle size:  22 G  Approach:  Superior  Medications:  40 mg methylPREDNISolone acetate 80 mg/mL  Patient tolerance:  Patient tolerated the procedure well with no immediate complications     Additional Comments: 1cc Bupivicaine plain, 0.5cc Depo-Medrol 80mg/mL    Procedure Note:  We discussed the risk and benefits of injections, including pain, infection, bleeding, damage to adjacent structures, risk of reaction to injection. We discussed the steroid/cortisone injections will not heal the problem but mat help decrease inflammation and help with symptoms. We discussed the risk of repeated injections. The patient expressed understanding and wanted to proceed with the injection. We performed a timeout to verify the proper patient, proper procedure, and the proper site. The injection site was prepared in a sterile fashion. The patient tolerated it well and there were no complication. We did discuss with the patient that steroid injections can cause some increase in blood sugar and blood pressure for up to a week after the injection.

## 2020-10-27 ENCOUNTER — OFFICE VISIT (OUTPATIENT)
Dept: ORTHOPEDICS | Facility: CLINIC | Age: 55
End: 2020-10-27
Payer: OTHER GOVERNMENT

## 2020-10-27 ENCOUNTER — TELEPHONE (OUTPATIENT)
Dept: ORTHOPEDICS | Facility: CLINIC | Age: 55
End: 2020-10-27

## 2020-10-27 VITALS
DIASTOLIC BLOOD PRESSURE: 75 MMHG | SYSTOLIC BLOOD PRESSURE: 125 MMHG | HEART RATE: 63 BPM | WEIGHT: 238 LBS | BODY MASS INDEX: 30.54 KG/M2 | HEIGHT: 74 IN

## 2020-10-27 DIAGNOSIS — G89.29 CHRONIC LEFT SHOULDER PAIN: ICD-10-CM

## 2020-10-27 DIAGNOSIS — M75.42 IMPINGEMENT SYNDROME OF LEFT SHOULDER: Primary | ICD-10-CM

## 2020-10-27 DIAGNOSIS — M75.82 TENDONITIS OF LEFT ROTATOR CUFF: Primary | ICD-10-CM

## 2020-10-27 DIAGNOSIS — M25.512 CHRONIC LEFT SHOULDER PAIN: ICD-10-CM

## 2020-10-27 PROCEDURE — 99213 PR OFFICE/OUTPT VISIT, EST, LEVL III, 20-29 MIN: ICD-10-PCS | Mod: S$PBB,,, | Performed by: STUDENT IN AN ORGANIZED HEALTH CARE EDUCATION/TRAINING PROGRAM

## 2020-10-27 PROCEDURE — 99213 OFFICE O/P EST LOW 20 MIN: CPT | Mod: S$PBB,,, | Performed by: STUDENT IN AN ORGANIZED HEALTH CARE EDUCATION/TRAINING PROGRAM

## 2020-10-27 PROCEDURE — 99999 PR PBB SHADOW E&M-EST. PATIENT-LVL III: ICD-10-PCS | Mod: PBBFAC,,, | Performed by: STUDENT IN AN ORGANIZED HEALTH CARE EDUCATION/TRAINING PROGRAM

## 2020-10-27 PROCEDURE — 99213 OFFICE O/P EST LOW 20 MIN: CPT | Mod: PBBFAC | Performed by: STUDENT IN AN ORGANIZED HEALTH CARE EDUCATION/TRAINING PROGRAM

## 2020-10-27 PROCEDURE — 99999 PR PBB SHADOW E&M-EST. PATIENT-LVL III: CPT | Mod: PBBFAC,,, | Performed by: STUDENT IN AN ORGANIZED HEALTH CARE EDUCATION/TRAINING PROGRAM

## 2020-10-27 RX ORDER — DIAZEPAM 5 MG/1
5 TABLET ORAL ONCE
Qty: 1 TABLET | Refills: 0 | Status: SHIPPED | OUTPATIENT
Start: 2020-10-27 | End: 2021-06-25

## 2020-10-27 RX ORDER — KETOROLAC TROMETHAMINE 10 MG/1
10 TABLET, FILM COATED ORAL EVERY 6 HOURS PRN
Qty: 12 TABLET | Refills: 0 | Status: SHIPPED | OUTPATIENT
Start: 2020-10-27 | End: 2020-10-30

## 2020-10-27 NOTE — PATIENT INSTRUCTIONS
Acetaminophen 1000mg, three times daily as needed    After finishing toradol, may restart meloxicam

## 2020-10-28 NOTE — PROGRESS NOTES
Orthopaedics Sports Medicine     Shoulder Initial Visit         10/27/2020      Referring MD: No ref. provider found    Chief Complaint   Patient presents with    Left Shoulder - Pain         History of Present Illness:   Colby Yepez is a 55 y.o. Right-hand dominant male who presents with LEFT shoulder pain and dysfunction that developed years ago.  He ultimately underwent left shoulder arthroscopy with biceps tenodesis and subacromial decompression with Dr. Patton in 2019.  He has continued to have pain since then.  He has been doing physical therapy since February and he has had 3 separate injections since surgery, subacromial, glenohumeral, and AC.  Despite these interventions he has continued to have pain that is constant and aching with intermittent sharp or shooting sensation.  He has tried nonoperative treatments including anti-inflammatories, muscle relaxers, heat, physical therapy, injections.  His symptoms are worse with lifting, reaching, pulling.  He also endorses night pain and muscle spasms.    Treatments to date: rest, NSAIDs, activity modification, muscle relaxers, physical therapy, corticosteroid injection    Past Medical History:   Past Medical History:   Diagnosis Date    Allergy     Hypotension, iatrogenic     Kidney stone     Liver cyst     Obesity     Thyroid disease        Past Surgical History:   Past Surgical History:   Procedure Laterality Date    ARTHROSCOPIC DEBRIDEMENT OF SHOULDER Left 12/9/2019    Procedure: DEBRIDEMENT, SHOULDER, ARTHROSCOPIC;  Surgeon: Lenny Patton MD;  Location: [facility] OR;  Service: Orthopedics;  Laterality: Left;    ARTHROSCOPY OF SHOULDER WITH DECOMPRESSION OF SUBACROMIAL SPACE Left 12/9/2019    Procedure: ARTHROSCOPY, SHOULDER, WITH SUBACROMIAL SPACE DECOMPRESSION;  Surgeon: Lenny Patton MD;  Location: [facility] OR;  Service: Orthopedics;  Laterality: Left;    BICEPS TENDON REPAIR Left 12/9/2019    Procedure: REPAIR, TENDON, BICEPS;   Surgeon: Lenny Patton MD;  Location: [facility] OR;  Service: Orthopedics;  Laterality: Left;  arthroscopic bicep tenodesis    CHONDROPLASTY OF SHOULDER Left 12/9/2019    Procedure: CHONDROPLASTY, SHOULDER;  Surgeon: Lenny Patton MD;  Location: [facility] OR;  Service: Orthopedics;  Laterality: Left;  arthroscopic    COLONOSCOPY N/A 5/13/2019    Procedure: COLONOSCOPY;  Surgeon: Soha Norton MD;  Location: [facility] ENDO;  Service: Endoscopy;  Laterality: N/A;    EXAMINATION UNDER ANESTHESIA Left 12/9/2019    Procedure: EXAM UNDER ANESTHESIA;  Surgeon: Lenny Patton MD;  Location: [facility] OR;  Service: Orthopedics;  Laterality: Left;    GANGLION CYST EXCISION Left     INJECTION OF JOINT Left 7/25/2019    Procedure: Left shoulder Joint injection with local;  Surgeon: Leonardo Collado MD;  Location: [facility] PAIN MGT;  Service: Pain Management;  Laterality: Left;    SINUS SURGERY  2007    TONSILLECTOMY, ADENOIDECTOMY, BILATERAL MYRINGOTOMY AND TUBES      VASECTOMY  10/27/2017       Medications:  Patient's Medications   New Prescriptions    DIAZEPAM (VALIUM) 5 MG TABLET    Take 1 tablet (5 mg total) by mouth once. for 1 dose    KETOROLAC (TORADOL) 10 MG TABLET    Take 1 tablet (10 mg total) by mouth every 6 (six) hours as needed for Pain (post-op pain).   Previous Medications    ASPIRIN (ECOTRIN) 81 MG EC TABLET    Take 1 tablet (81 mg total) by mouth once daily. With food    DICLOFENAC SODIUM (VOLTAREN) 1 % GEL    Apply 2 g topically 2 (two) times daily.    IBUPROFEN (ADVIL,MOTRIN) 800 MG TABLET    TAKE ONE TABLET BY MOUTH THREE TIMES DAILY AS NEEDED FOR PAIN    MELOXICAM (MOBIC) 15 MG TABLET    Take 1 tablet (15 mg total) by mouth once daily.    MULTIVITAMIN (ONE DAILY MULTIVITAMIN) PER TABLET    Take 1 tablet by mouth once daily. Hold 5 days prior to surgery    THYROID, PORK, (ARMOUR THYROID) 60 MG TAB    Take 60 mg by mouth before breakfast.    TIZANIDINE (ZANAFLEX) 4 MG TABLET    Take 1 tablet (4 mg total) by  "mouth 3 (three) times daily as needed.   Modified Medications    No medications on file   Discontinued Medications    No medications on file       Allergies:   Review of patient's allergies indicates:   Allergen Reactions    Tramadol Other (See Comments)     dizziness       Social History:   Home town: [place]  Occupation: , former  [employer]  Alcohol use: He reports current alcohol use.  Tobacco use: He reports that he has never smoked. He has never used smokeless tobacco.    Review of systems:  History of recent illness, fevers, shakes, or chills: no  History of cardiac problems or chest pain: no  History of pulmonary problems or asthma: no  History of diabetes: no  History of prior dvt or clotting problems: no  History of sleep apnea: no      Physical Examination:  Estimated body mass index is 30.56 kg/m² as calculated from the following:    Height as of this encounter: 6' 2" (1.88 m).    Weight as of this encounter: 108 kg (238 lb).    General  Healthy appearing male in no acute distress  Alert and oriented, normal mood, appropriate affect    Shoulder Examination:  Patient is alert and oriented, no distress. Skin is intact. Neuro is normal with no focal motor or sensory findings.    Cervical exam is unremarkable. Intact cervical ROM. Negative Spurling's test    Physical Exam:  RIGHT    LEFT    Atrophy:   (-)    (-)   Deformity   (-)    (-)  Scap Dyskinesis/Winging (-)    +    Tenderness:          Greater Tuberosity             (-)    +  Bicipital Groove  (-)    +  AC joint   (-)    (-)  Other:     ROM:  Forward Elevation 180    160  Abduction  120    110  ER (at side)  80    40  IR   T8    T12    Strength:   Supraspinatus  5/5    4+5  Infraspinatus  5/5    4+5  Subscap / IR  5/5    5/5     Special Tests:   Neer:    (-)    +   Collier:   (-)    +   SS Stress:   (-)    +   Belly Press:   (-)    (-)   Lift " Off:    (-)    (-)   [place]'s:   (-)    +   Speeds:   (-)    (-)   Resisted Thrower's:   (-)    (-)   Cross Arm Abduction:  (-)    (-)    Neurovascular examination  - Motor grossly intact bilaterally to shoulder abduction, elbow flexion and extension, wrist flexion and extension, and intrinsic hand musculature  - Sensation intact to light touch bilaterally in axillary, median, radial, and ulnar distributions  - Symmetrical radial pulses      Imaging:  XR Left shoulder (10/27/2020):  No fracture or dislocation.  Joint spaces maintained with minimal AC joint degenerative findings.  Well-defined 8 mm lucency noted within the proximal left humerus.  Correlate for any prior surgery.  Lung parenchyma clear.    Physician Read: I agree with the above impression.      Impression:  55 y.o. male with left shoulder pain after previous surgery, suspect rotator cuff pathology      Plan:  1. Discussed further diagnosis and treatment options with him today.  2. He is a year out from surgery and at this point I would expect that he is fully recovered from the postop period.  Considering his current symptoms, and exhaustive attempts at non-operative treatment I would like to get another MRI of his shoulder with intra-articular arthrogram. I suspect he has rotator cuff tear pathology.   3. Follow up after MRI           Dante Dickerson MD

## 2020-10-28 NOTE — H&P (VIEW-ONLY)
Orthopaedics Sports Medicine     Shoulder Initial Visit         10/27/2020      Referring MD: No ref. provider found    Chief Complaint   Patient presents with    Left Shoulder - Pain         History of Present Illness:   Colby Yepez is a 55 y.o. Right-hand dominant male who presents with LEFT shoulder pain and dysfunction that developed years ago.  He ultimately underwent left shoulder arthroscopy with biceps tenodesis and subacromial decompression with Dr. Patton in 2019.  He has continued to have pain since then.  He has been doing physical therapy since February and he has had 3 separate injections since surgery, subacromial, glenohumeral, and AC.  Despite these interventions he has continued to have pain that is constant and aching with intermittent sharp or shooting sensation.  He has tried nonoperative treatments including anti-inflammatories, muscle relaxers, heat, physical therapy, injections.  His symptoms are worse with lifting, reaching, pulling.  He also endorses night pain and muscle spasms.    Treatments to date: rest, NSAIDs, activity modification, muscle relaxers, physical therapy, corticosteroid injection    Past Medical History:   Past Medical History:   Diagnosis Date    Allergy     Hypotension, iatrogenic     Kidney stone     Liver cyst     Obesity     Thyroid disease        Past Surgical History:   Past Surgical History:   Procedure Laterality Date    ARTHROSCOPIC DEBRIDEMENT OF SHOULDER Left 12/9/2019    Procedure: DEBRIDEMENT, SHOULDER, ARTHROSCOPIC;  Surgeon: Lenny Patton MD;  Location: [facility] OR;  Service: Orthopedics;  Laterality: Left;    ARTHROSCOPY OF SHOULDER WITH DECOMPRESSION OF SUBACROMIAL SPACE Left 12/9/2019    Procedure: ARTHROSCOPY, SHOULDER, WITH SUBACROMIAL SPACE DECOMPRESSION;  Surgeon: Lenny Patton MD;  Location: [facility] OR;  Service: Orthopedics;  Laterality: Left;    BICEPS TENDON REPAIR Left 12/9/2019    Procedure: REPAIR, TENDON, BICEPS;   Surgeon: Lenny Patton MD;  Location: [facility] OR;  Service: Orthopedics;  Laterality: Left;  arthroscopic bicep tenodesis    CHONDROPLASTY OF SHOULDER Left 12/9/2019    Procedure: CHONDROPLASTY, SHOULDER;  Surgeon: Lenny Patton MD;  Location: [facility] OR;  Service: Orthopedics;  Laterality: Left;  arthroscopic    COLONOSCOPY N/A 5/13/2019    Procedure: COLONOSCOPY;  Surgeon: Soha Norton MD;  Location: [facility] ENDO;  Service: Endoscopy;  Laterality: N/A;    EXAMINATION UNDER ANESTHESIA Left 12/9/2019    Procedure: EXAM UNDER ANESTHESIA;  Surgeon: Lenny Patton MD;  Location: [facility] OR;  Service: Orthopedics;  Laterality: Left;    GANGLION CYST EXCISION Left     INJECTION OF JOINT Left 7/25/2019    Procedure: Left shoulder Joint injection with local;  Surgeon: Leonardo Collado MD;  Location: [facility] PAIN MGT;  Service: Pain Management;  Laterality: Left;    SINUS SURGERY  2007    TONSILLECTOMY, ADENOIDECTOMY, BILATERAL MYRINGOTOMY AND TUBES      VASECTOMY  10/27/2017       Medications:  Patient's Medications   New Prescriptions    DIAZEPAM (VALIUM) 5 MG TABLET    Take 1 tablet (5 mg total) by mouth once. for 1 dose    KETOROLAC (TORADOL) 10 MG TABLET    Take 1 tablet (10 mg total) by mouth every 6 (six) hours as needed for Pain (post-op pain).   Previous Medications    ASPIRIN (ECOTRIN) 81 MG EC TABLET    Take 1 tablet (81 mg total) by mouth once daily. With food    DICLOFENAC SODIUM (VOLTAREN) 1 % GEL    Apply 2 g topically 2 (two) times daily.    IBUPROFEN (ADVIL,MOTRIN) 800 MG TABLET    TAKE ONE TABLET BY MOUTH THREE TIMES DAILY AS NEEDED FOR PAIN    MELOXICAM (MOBIC) 15 MG TABLET    Take 1 tablet (15 mg total) by mouth once daily.    MULTIVITAMIN (ONE DAILY MULTIVITAMIN) PER TABLET    Take 1 tablet by mouth once daily. Hold 5 days prior to surgery    THYROID, PORK, (ARMOUR THYROID) 60 MG TAB    Take 60 mg by mouth before breakfast.    TIZANIDINE (ZANAFLEX) 4 MG TABLET    Take 1 tablet (4 mg total) by  "mouth 3 (three) times daily as needed.   Modified Medications    No medications on file   Discontinued Medications    No medications on file       Allergies:   Review of patient's allergies indicates:   Allergen Reactions    Tramadol Other (See Comments)     dizziness       Social History:   Home town: [place]  Occupation: , former  [employer]  Alcohol use: He reports current alcohol use.  Tobacco use: He reports that he has never smoked. He has never used smokeless tobacco.    Review of systems:  History of recent illness, fevers, shakes, or chills: no  History of cardiac problems or chest pain: no  History of pulmonary problems or asthma: no  History of diabetes: no  History of prior dvt or clotting problems: no  History of sleep apnea: no      Physical Examination:  Estimated body mass index is 30.56 kg/m² as calculated from the following:    Height as of this encounter: 6' 2" (1.88 m).    Weight as of this encounter: 108 kg (238 lb).    General  Healthy appearing male in no acute distress  Alert and oriented, normal mood, appropriate affect    Shoulder Examination:  Patient is alert and oriented, no distress. Skin is intact. Neuro is normal with no focal motor or sensory findings.    Cervical exam is unremarkable. Intact cervical ROM. Negative Spurling's test    Physical Exam:  RIGHT    LEFT    Atrophy:   (-)    (-)   Deformity   (-)    (-)  Scap Dyskinesis/Winging (-)    +    Tenderness:          Greater Tuberosity             (-)    +  Bicipital Groove  (-)    +  AC joint   (-)    (-)  Other:     ROM:  Forward Elevation 180    160  Abduction  120    110  ER (at side)  80    40  IR   T8    T12    Strength:   Supraspinatus  5/5    4+5  Infraspinatus  5/5    4+5  Subscap / IR  5/5    5/5     Special Tests:   Neer:    (-)    +   Collier:   (-)    +   SS Stress:   (-)    +   Belly Press:   (-)    (-)   Lift " Off:    (-)    (-)   [place]'s:   (-)    +   Speeds:   (-)    (-)   Resisted Thrower's:   (-)    (-)   Cross Arm Abduction:  (-)    (-)    Neurovascular examination  - Motor grossly intact bilaterally to shoulder abduction, elbow flexion and extension, wrist flexion and extension, and intrinsic hand musculature  - Sensation intact to light touch bilaterally in axillary, median, radial, and ulnar distributions  - Symmetrical radial pulses      Imaging:  XR Left shoulder (10/27/2020):  No fracture or dislocation.  Joint spaces maintained with minimal AC joint degenerative findings.  Well-defined 8 mm lucency noted within the proximal left humerus.  Correlate for any prior surgery.  Lung parenchyma clear.    Physician Read: I agree with the above impression.      Impression:  55 y.o. male with left shoulder pain after previous surgery, suspect rotator cuff pathology      Plan:  1. Discussed further diagnosis and treatment options with him today.  2. He is a year out from surgery and at this point I would expect that he is fully recovered from the postop period.  Considering his current symptoms, and exhaustive attempts at non-operative treatment I would like to get another MRI of his shoulder with intra-articular arthrogram. I suspect he has rotator cuff tear pathology.   3. Follow up after MRI           Dante Dickerson MD

## 2020-11-09 ENCOUNTER — HOSPITAL ENCOUNTER (OUTPATIENT)
Dept: RADIOLOGY | Facility: HOSPITAL | Age: 55
Discharge: HOME | End: 2020-11-09
Attending: STUDENT IN AN ORGANIZED HEALTH CARE EDUCATION/TRAINING PROGRAM
Payer: OTHER GOVERNMENT

## 2020-11-09 DIAGNOSIS — M75.42 IMPINGEMENT SYNDROME OF LEFT SHOULDER: ICD-10-CM

## 2020-11-09 DIAGNOSIS — G89.29 CHRONIC LEFT SHOULDER PAIN: ICD-10-CM

## 2020-11-09 DIAGNOSIS — M25.512 CHRONIC LEFT SHOULDER PAIN: ICD-10-CM

## 2020-11-09 PROCEDURE — A9585 GADOBUTROL INJECTION: HCPCS | Performed by: STUDENT IN AN ORGANIZED HEALTH CARE EDUCATION/TRAINING PROGRAM

## 2020-11-09 PROCEDURE — 73222 MRI JOINT UPR EXTREM W/DYE: CPT | Mod: TC,LT

## 2020-11-09 PROCEDURE — 73040 CONTRAST X-RAY OF SHOULDER: CPT | Mod: TC,LT

## 2020-11-09 PROCEDURE — 25500020 PHARM REV CODE 255: Performed by: STUDENT IN AN ORGANIZED HEALTH CARE EDUCATION/TRAINING PROGRAM

## 2020-11-09 RX ORDER — GADOBUTROL 604.72 MG/ML
10 INJECTION INTRAVENOUS
Status: COMPLETED | OUTPATIENT
Start: 2020-11-09 | End: 2020-11-09

## 2020-11-09 RX ADMIN — IOHEXOL 10 ML: 300 INJECTION, SOLUTION INTRAVENOUS at 02:11

## 2020-11-09 RX ADMIN — GADOBUTROL 0.2 ML: 604.72 INJECTION INTRAVENOUS at 02:11

## 2020-11-09 NOTE — DISCHARGE SUMMARY
Pre Op Diagnosis: left shoulder pain     Post Op Diagnosis: same     Procedure:  Left shoulder arthrogram     Procedure performed by: Rxoane MACHUCA, Rand CRANE     Written Informed Consent Obtained: Yes     Specimen Removed:  no     Estimated Blood Loss:  minimal     Findings: Local anesthesia and moderate sedation were used.     The patient tolerated the procedure well and there were no complications.      Sterile technique was performed in the anterior left shoulder, lidocaine was used as a local anesthetic.  intraarticular contrast into left shoulder.  Pt tolerated the procedure well without immediate complications.  Please see radiologist report for details. F/u with PCP and/or ordering physician.

## 2020-11-10 ENCOUNTER — HOSPITAL ENCOUNTER (OUTPATIENT)
Dept: RADIOLOGY | Facility: HOSPITAL | Age: 55
Discharge: HOME | End: 2020-11-10
Attending: STUDENT IN AN ORGANIZED HEALTH CARE EDUCATION/TRAINING PROGRAM
Payer: OTHER GOVERNMENT

## 2020-11-10 ENCOUNTER — OFFICE VISIT (OUTPATIENT)
Dept: ORTHOPEDICS | Facility: CLINIC | Age: 55
End: 2020-11-10
Payer: OTHER GOVERNMENT

## 2020-11-10 VITALS
BODY MASS INDEX: 30.54 KG/M2 | SYSTOLIC BLOOD PRESSURE: 115 MMHG | HEIGHT: 74 IN | HEART RATE: 72 BPM | DIASTOLIC BLOOD PRESSURE: 77 MMHG | WEIGHT: 238 LBS

## 2020-11-10 DIAGNOSIS — M25.512 CHRONIC LEFT SHOULDER PAIN: ICD-10-CM

## 2020-11-10 DIAGNOSIS — M75.42 IMPINGEMENT SYNDROME OF LEFT SHOULDER: Primary | ICD-10-CM

## 2020-11-10 DIAGNOSIS — M75.82 TENDONITIS OF LEFT ROTATOR CUFF: ICD-10-CM

## 2020-11-10 DIAGNOSIS — M54.2 NECK PAIN: Primary | ICD-10-CM

## 2020-11-10 DIAGNOSIS — G89.29 CHRONIC LEFT SHOULDER PAIN: ICD-10-CM

## 2020-11-10 DIAGNOSIS — M54.2 NECK PAIN: ICD-10-CM

## 2020-11-10 PROCEDURE — 99214 OFFICE O/P EST MOD 30 MIN: CPT | Mod: PBBFAC,25 | Performed by: STUDENT IN AN ORGANIZED HEALTH CARE EDUCATION/TRAINING PROGRAM

## 2020-11-10 PROCEDURE — 99999 PR PBB SHADOW E&M-EST. PATIENT-LVL IV: CPT | Mod: PBBFAC,,, | Performed by: STUDENT IN AN ORGANIZED HEALTH CARE EDUCATION/TRAINING PROGRAM

## 2020-11-10 PROCEDURE — 99214 PR OFFICE/OUTPT VISIT, EST, LEVL IV, 30-39 MIN: ICD-10-PCS | Mod: S$PBB,,, | Performed by: STUDENT IN AN ORGANIZED HEALTH CARE EDUCATION/TRAINING PROGRAM

## 2020-11-10 PROCEDURE — 99214 OFFICE O/P EST MOD 30 MIN: CPT | Mod: S$PBB,,, | Performed by: STUDENT IN AN ORGANIZED HEALTH CARE EDUCATION/TRAINING PROGRAM

## 2020-11-10 PROCEDURE — 72050 XR CERVICAL SPINE COMPLETE 5 VIEW: ICD-10-PCS | Mod: 26,,, | Performed by: RADIOLOGY

## 2020-11-10 PROCEDURE — 72050 X-RAY EXAM NECK SPINE 4/5VWS: CPT | Mod: 26,,, | Performed by: RADIOLOGY

## 2020-11-10 PROCEDURE — 99999 PR PBB SHADOW E&M-EST. PATIENT-LVL IV: ICD-10-PCS | Mod: PBBFAC,,, | Performed by: STUDENT IN AN ORGANIZED HEALTH CARE EDUCATION/TRAINING PROGRAM

## 2020-11-10 PROCEDURE — 72050 X-RAY EXAM NECK SPINE 4/5VWS: CPT | Mod: TC

## 2020-11-10 RX ORDER — METRONIDAZOLE 7.5 MG/G
CREAM TOPICAL
COMMUNITY
Start: 2020-10-27

## 2020-11-10 RX ORDER — METRONIDAZOLE 250 MG/1
TABLET ORAL
Status: ON HOLD | COMMUNITY
Start: 2020-10-27 | End: 2020-12-28

## 2020-11-11 NOTE — PROGRESS NOTES
Orthopaedic Follow-Up Visit    Last Appointment: 10/27/2020  Diagnosis: left shoulder pain  Prior Procedure: arthroscopy with biceps tenodesis (Dr. Patton 2019)    Colby Yepez is a 55 y.o. male who is here for f/u evaluation of left shoulder pain. The patient was last seen here by me on 10/27/2020 at which point we decided to send him for MRI of the left shoulder prior to considering further treatment options. The patient returns today reporting that the symptoms have persisted and is interested in proceeding with further treatment options.     To review his history, he is a 55 y.o. Right-hand dominant male who presents with LEFT shoulder pain and dysfunction that developed years ago.  He ultimately underwent left shoulder arthroscopy with biceps tenodesis and subacromial decompression with Dr. Patton in 2019.  He has continued to have pain since then.  He has been doing physical therapy since February and he has had 3 separate injections since surgery, subacromial, glenohumeral, and AC.  Despite these interventions he has continued to have pain that is constant and aching with intermittent sharp or shooting sensation.  He has tried nonoperative treatments including anti-inflammatories, muscle relaxers, heat, physical therapy, injections.  His symptoms are worse with lifting, reaching, pulling.  He also endorses night pain and muscle spasms.    Patient's medications, allergies, past medical, surgical, social and family histories were reviewed and updated as appropriate.    Review of Systems   All systems reviewed were negative.  Specifically, the patient denies fever, chills, weight loss, chest pain, shortness of breath, or dyspnea on exertion.      Past Medical History:   Diagnosis Date    Allergy     Hypotension, iatrogenic     Kidney stone     Liver cyst     Obesity     Thyroid disease        Past Surgical History:   Procedure Laterality Date    ARTHROSCOPIC DEBRIDEMENT OF SHOULDER Left 12/9/2019     Procedure: DEBRIDEMENT, SHOULDER, ARTHROSCOPIC;  Surgeon: Lenny Patton MD;  Location: [facility] OR;  Service: Orthopedics;  Laterality: Left;    ARTHROSCOPY OF SHOULDER WITH DECOMPRESSION OF SUBACROMIAL SPACE Left 12/9/2019    Procedure: ARTHROSCOPY, SHOULDER, WITH SUBACROMIAL SPACE DECOMPRESSION;  Surgeon: Lenny Patton MD;  Location: [facility] OR;  Service: Orthopedics;  Laterality: Left;    BICEPS TENDON REPAIR Left 12/9/2019    Procedure: REPAIR, TENDON, BICEPS;  Surgeon: Lenny Patton MD;  Location: [facility] OR;  Service: Orthopedics;  Laterality: Left;  arthroscopic bicep tenodesis    CHONDROPLASTY OF SHOULDER Left 12/9/2019    Procedure: CHONDROPLASTY, SHOULDER;  Surgeon: Lenny Patton MD;  Location: [facility] OR;  Service: Orthopedics;  Laterality: Left;  arthroscopic    COLONOSCOPY N/A 5/13/2019    Procedure: COLONOSCOPY;  Surgeon: Soha Norton MD;  Location: [facility] ENDO;  Service: Endoscopy;  Laterality: N/A;    EXAMINATION UNDER ANESTHESIA Left 12/9/2019    Procedure: EXAM UNDER ANESTHESIA;  Surgeon: Lenny Patton MD;  Location: [facility] OR;  Service: Orthopedics;  Laterality: Left;    GANGLION CYST EXCISION Left     INJECTION OF JOINT Left 7/25/2019    Procedure: Left shoulder Joint injection with local;  Surgeon: Leonardo Collado MD;  Location: [facility] PAIN MGT;  Service: Pain Management;  Laterality: Left;    SINUS SURGERY  2007    TONSILLECTOMY, ADENOIDECTOMY, BILATERAL MYRINGOTOMY AND TUBES      VASECTOMY  10/27/2017       Patient's Medications   New Prescriptions    No medications on file   Previous Medications    ASPIRIN (ECOTRIN) 81 MG EC TABLET    Take 1 tablet (81 mg total) by mouth once daily. With food    DIAZEPAM (VALIUM) 5 MG TABLET    Take 1 tablet (5 mg total) by mouth once. for 1 dose    DICLOFENAC SODIUM (VOLTAREN) 1 % GEL    Apply 2 g topically 2 (two) times daily.    IBUPROFEN (ADVIL,MOTRIN) 800 MG TABLET    TAKE ONE TABLET BY MOUTH THREE TIMES DAILY AS NEEDED FOR PAIN     MELOXICAM (MOBIC) 15 MG TABLET    Take 1 tablet (15 mg total) by mouth once daily.    METRONIDAZOLE (FLAGYL) 250 MG TABLET        METRONIDAZOLE 0.75% (METROCREAM) 0.75 % CREA    ASTON EXT AA BID    MULTIVITAMIN (ONE DAILY MULTIVITAMIN) PER TABLET    Take 1 tablet by mouth once daily. Hold 5 days prior to surgery    THYROID, PORK, (ARMOUR THYROID) 60 MG TAB    Take 60 mg by mouth before breakfast.    TIZANIDINE (ZANAFLEX) 4 MG TABLET    Take 1 tablet (4 mg total) by mouth 3 (three) times daily as needed.   Modified Medications    No medications on file   Discontinued Medications    No medications on file       Family History   Problem Relation Age of Onset    Hypertension Mother     Prostate cancer Father     Hyperlipidemia Father        Review of patient's allergies indicates:   Allergen Reactions    Tramadol Other (See Comments)     dizziness         Objective:      Physical Exam  Patient is alert and oriented, no distress. Skin is intact. Neuro is normal with no focal motor or sensory findings.    Cervical exam is unremarkable. Intact cervical ROM. Negative Spurling's test    Physical Exam:  RIGHT    LEFT    Atrophy:   (-)    (-)   Deformity   (-)    (-)  Scap Dyskinesis/Winging (-)    (-)    Tenderness:          Greater Tuberosity  (-)    +  Bicipital Groove  (-)    +  AC joint   (-)    (-)  Other:     ROM:  Forward Elevation 180    160  Abduction  120    110  ER (at side)  80    60  IR   T8    T12    Strength:   Supraspinatus  5/5    5/5  Infraspinatus  5/5    5/5  Subscap / IR  5/5    5/5     Special Tests:   Apprehension:   (-)    (-)   Deisi Relocation:  (-)    (-)   Jerk / Posterior Load:  (-)    (-)   Neer:    (-)    (-)   Collier:   (-)    +   SS Stress:   (-)    +   Belly Press:   (-)    (-)   Lift Off:    (-)    (-)   Jefferson's:   (-)    (-)   Speeds:   (-)    (-)   Resisted Thrower's:   (-)    +   Cross Arm Abduction:  (-)    (-)      Imaging:    Left shoulder MRI (11/9/2020):  FINDINGS:  Mild  acromioclavicular arthrosis with associated synovial hypertrophy and subchondral cystic change at the acromion.  There is an os acromiale.  8 mm ganglion cyst arising from the cephalad margin of the acromioclavicular joint.  Type 1 acromion.     Mild thickening and intermediate signal throughout the supraspinatus and infraspinatus tendons consistent with mild tendinosis.  The subscapularis and teres minor tendons are within normal limits.  No evidence of full-thickness rotator cuff tear.     Satisfactory opacification of the glenohumeral joint with gadolinium contrast.  Full-thickness posterior labral tear beginning at the 3 o'clock position with extension to the anterior inferior labrum at approximately the 7 o'clock position.     Complete biceps tendon tear from the biceps labral complex with retraction beyond the field of view.     The inferior glenohumeral ligament complex is within normal limits.     No full-thickness chondral defect identified.    Physician Read: I agree with the above impression.    X-Ray Cervical Spine Complete 5 view  Narrative: EXAMINATION:  XR CERVICAL SPINE COMPLETE 5 VIEW    CLINICAL HISTORY:  . Cervicalgia    TECHNIQUE:  AP, Lateral, bilateral oblique and open mouth views of the cervical spine were performed.    COMPARISON:  02/29/2012    FINDINGS:  Vertebral body heights and alignment are within normal limits.  There is mild disc height loss at C5-6 which appears slightly worsened from prior.  Posterior elements appear intact without acute fractures or subluxations demonstrated.  Odontoid process appears intact.  Atlantoaxial articulations appear normal.  Prevertebral soft tissues are within normal limits.  Impression: Degenerative findings as above    Electronically signed by: Azeem Paris MD  Date:    11/11/2020  Time:    08:03       Physician read: I agree with the above impression.        Assessment/Plan:   Colby Yepez is a 55 y.o. male with left shoulder pain after  previous shoulder arthroscopy with biceps tenodesis    Plan:    1. Discuss the MRI findings with him today.  I do not see any significant rotator cuff pathology on the MRI, nor do I see any other significant pathology on the MRI that correlates with a specific physical exam finding.  Because of that, I do not recommend proceeding with surgery as there is not a specific target for operative intervention.  2. He has tried multiple different corticosteroid injections and so we agreed that additional injection would not serve much progress for him.  3. Therefore, I proposed 2 different treatment options.  First of all, we can began evaluating his cervical spine for possible source of radicular symptoms.  We will get an x-ray on his way out of clinic today and pinning on how that looks we may get an MRI of his cervical spine.  I would also like to refer him to our interventional pain specialist for consideration of nerve ablation around his left shoulder.  4. He is in agreement with these plans.  Referral was placed today for interventional pain to assess both the cervical spine for radicular symptoms as well as the left shoulder for consideration of nerve ablation.  5. I am happy see him back here in this clinic at any point, but for now I plan on transitioning his care to the [facility] as long as they think there is something to address.  Follow up with me as needed        Dante Dickerson MD

## 2020-11-30 RX ORDER — METHYLPREDNISOLONE ACETATE 80 MG/ML
40 INJECTION, SUSPENSION INTRA-ARTICULAR; INTRALESIONAL; INTRAMUSCULAR; SOFT TISSUE
Status: DISCONTINUED | OUTPATIENT
Start: 2020-10-07 | End: 2020-11-30 | Stop reason: HOSPADM

## 2020-12-16 ENCOUNTER — OFFICE VISIT (OUTPATIENT)
Dept: PAIN MEDICINE | Facility: CLINIC | Age: 55
End: 2020-12-16
Payer: OTHER GOVERNMENT

## 2020-12-16 VITALS
BODY MASS INDEX: 31.06 KG/M2 | WEIGHT: 242 LBS | DIASTOLIC BLOOD PRESSURE: 74 MMHG | HEART RATE: 73 BPM | HEIGHT: 74 IN | RESPIRATION RATE: 18 BRPM | SYSTOLIC BLOOD PRESSURE: 117 MMHG

## 2020-12-16 DIAGNOSIS — G89.29 CHRONIC LEFT SHOULDER PAIN: ICD-10-CM

## 2020-12-16 DIAGNOSIS — M75.82 TENDONITIS OF LEFT ROTATOR CUFF: ICD-10-CM

## 2020-12-16 DIAGNOSIS — M75.42 IMPINGEMENT SYNDROME OF LEFT SHOULDER: ICD-10-CM

## 2020-12-16 DIAGNOSIS — M25.512 CHRONIC LEFT SHOULDER PAIN: ICD-10-CM

## 2020-12-16 DIAGNOSIS — M24.112 LABRAL TEAR OF SHOULDER, DEGENERATIVE, LEFT: Primary | ICD-10-CM

## 2020-12-16 PROCEDURE — 99214 OFFICE O/P EST MOD 30 MIN: CPT | Mod: S$PBB,,, | Performed by: PHYSICAL MEDICINE & REHABILITATION

## 2020-12-16 PROCEDURE — 99999 PR PBB SHADOW E&M-EST. PATIENT-LVL V: ICD-10-PCS | Mod: PBBFAC,,, | Performed by: PHYSICAL MEDICINE & REHABILITATION

## 2020-12-16 PROCEDURE — 99215 OFFICE O/P EST HI 40 MIN: CPT | Mod: PBBFAC | Performed by: PHYSICAL MEDICINE & REHABILITATION

## 2020-12-16 PROCEDURE — 99999 PR PBB SHADOW E&M-EST. PATIENT-LVL V: CPT | Mod: PBBFAC,,, | Performed by: PHYSICAL MEDICINE & REHABILITATION

## 2020-12-16 PROCEDURE — 99214 PR OFFICE/OUTPT VISIT, EST, LEVL IV, 30-39 MIN: ICD-10-PCS | Mod: S$PBB,,, | Performed by: PHYSICAL MEDICINE & REHABILITATION

## 2020-12-16 NOTE — PROGRESS NOTES
Established Patient Chronic Pain Note (Follow up visit)    Chief Complaint:   Chief Complaint   Patient presents with    Shoulder Pain     left       SUBJECTIVE:    Colby Yepez is a 55 y.o. male who presents to the clinic for a follow-up appointment for left shoulder pain.  He was last seen in the clinic about 1 year ago for similar complaints.  He has since undergone a left shoulder scope with biceps tenodesis, and was recently followed up by Orthopedics who referred him back to our clinic for further evaluation and management.  Since the last visit, Colby Yepez states the pain has been persistant. Current pain intensity is 5/10.    Patient denies night fever/night sweats, urinary incontinence, bowel incontinence, significant weight loss, significant motor weakness and loss of sensations.    Pain Disability Index Review:  Last 3 PDI Scores 7/25/2019   Pain Disability Index (PDI) 30     Interval HPI 07/25/2019:  This patient is a 54 y.o. male who presents today complaining of chronic left shoulder pain. The patient describes this pain as follows.     - duration of pain: > 1 year   - timing: constant   - character: aching, pulling  - radiating, dermatomal: non-radiating for most part, occasionally extends into right forearm, mostly localized in shoulder joint  - antecedent trauma, prior spinal surgery: patient reports prior trauma, no prior spinal surgery   - other surgeries: none  - pertinent negatives: No fever, No chills, No weight loss, No bladder dysfunction, No bowel dysfunction, No extremity weakness, No saddle anesthesia  - pertinent positives: none        Non-Pharmacologic Treatments:  Physical Therapy/Home Exercise: yes  Ice/Heat:yes  TENS: no  Acupuncture: no  Massage: no  Chiropractic: no    Other: no    Pain Medications:  - Opioids:  Norco, tramadol  - Adjuvant Medications: Tylenol, Zanaflex, Mobic, Advil, Voltaren gel  - Anti-Coagulants: Aspirin    Opioid Contract:  No     report:   Reviewed and consistent with medication use as prescribed.        Pain Procedures:   -left glenohumeral joint injection on 04/17/2019, 60% relief  -left glenohumeral joint injection on 07/25/2019, limited relief  - left shoulder arthroscopy with biceps tenodesis in 2019  - left subacromial bursa injection 08/19/2020, limited relief  -left AC joint injection 10/07/2020, limited relief    Imaging:   X-ray cervical spine 11/10/2020:  Vertebral body heights and alignment are within normal limits.  There is mild disc height loss at C5-6 which appears slightly worsened from prior.  Posterior elements appear intact without acute fractures or subluxations demonstrated.  Odontoid process appears intact.  Atlantoaxial articulations appear normal.  Prevertebral soft tissues are within normal limits.    MRI arthrogram left shoulder 11/09/2020:  Mild acromioclavicular arthrosis with associated synovial hypertrophy and subchondral cystic change at the acromion.  There is an os acromiale.  8 mm ganglion cyst arising from the cephalad margin of the acromioclavicular joint.  Type 1 acromion.     Mild thickening and intermediate signal throughout the supraspinatus and infraspinatus tendons consistent with mild tendinosis.  The subscapularis and teres minor tendons are within normal limits.  No evidence of full-thickness rotator cuff tear.     Satisfactory opacification of the glenohumeral joint with gadolinium contrast.  Full-thickness posterior labral tear beginning at the 3 o'clock position with extension to the anterior inferior labrum at approximately the 7 o'clock position.     Complete biceps tendon tear from the biceps labral complex with retraction beyond the field of view.     The inferior glenohumeral ligament complex is within normal limits.     No full-thickness chondral defect identified.    X-ray left shoulder 08/19/2020:  No fracture or dislocation.  Joint spaces maintained with minimal AC joint degenerative findings.   Well-defined 8 mm lucency noted within the proximal left humerus.  Correlate for any prior surgery.  Lung parenchyma clear.    PMHx,PSHx, Social history, and Family history:  I have reviewed the patient's medical, surgical, social, and family history in detail and updated the computerized patient record.    Review of patient's allergies indicates:   Allergen Reactions    Tramadol Other (See Comments)     dizziness       Current Outpatient Medications   Medication Sig    diclofenac sodium (VOLTAREN) 1 % Gel Apply 2 g topically 2 (two) times daily.    ibuprofen (ADVIL,MOTRIN) 800 MG tablet TAKE ONE TABLET BY MOUTH THREE TIMES DAILY AS NEEDED FOR PAIN (Patient taking differently: Take 400 mg by mouth every 6 (six) hours as needed. Hold 5 days prior to surgery)    meloxicam (MOBIC) 15 MG tablet Take 1 tablet (15 mg total) by mouth once daily.    metronidazole 0.75% (METROCREAM) 0.75 % Crea ASTON EXT AA BID    multivitamin (ONE DAILY MULTIVITAMIN) per tablet Take 1 tablet by mouth once daily. Hold 5 days prior to surgery    tiZANidine (ZANAFLEX) 4 MG tablet Take 1 tablet (4 mg total) by mouth 3 (three) times daily as needed.    aspirin (ECOTRIN) 81 MG EC tablet Take 1 tablet (81 mg total) by mouth once daily. With food    diazePAM (VALIUM) 5 MG tablet Take 1 tablet (5 mg total) by mouth once. for 1 dose    metroNIDAZOLE (FLAGYL) 250 MG tablet     thyroid, pork, (ARMOUR THYROID) 60 mg Tab Take 60 mg by mouth before breakfast.     No current facility-administered medications for this visit.      Facility-Administered Medications Ordered in Other Visits   Medication    lidocaine (PF) 10 mg/ml (1%) injection 5 mg         REVIEW OF SYSTEMS:    GENERAL:  No weight loss, malaise or fevers.  HEENT:   No recent changes in vision or hearing  NECK:  Negative for lumps, no difficulty with swallowing.  RESPIRATORY:  Negative for cough, wheezing or shortness of breath, patient denies any recent URI.  CARDIOVASCULAR:   "Negative for chest pain, leg swelling or palpitations.  GI:  Negative for abdominal discomfort, blood in stools or black stools or change in bowel habits.  MUSCULOSKELETAL:  See HPI.  SKIN:  Negative for lesions, rash, and itching.  PSYCH:  No mood disorder or recent psychosocial stressors.  Patients sleep is not disturbed secondary to pain.  HEMATOLOGY/LYMPHOLOGY:  Negative for prolonged bleeding, bruising easily or swollen nodes.  Patient is currently taking anti-coagulants - ASA  NEURO:   No history of headaches, syncope, paralysis, seizures or tremors.  All other reviewed and negative other than HPI.    OBJECTIVE:    /74 (BP Location: Right arm, Patient Position: Sitting, BP Method: Medium (Automatic))   Pulse 73   Resp 18   Ht 6' 2" (1.88 m)   Wt 109.8 kg (242 lb)   BMI 31.07 kg/m²     PHYSICAL EXAMINATION:    GENERAL: Well appearing, in no acute distress, alert and oriented x3.  PSYCH:  Mood and affect appropriate.  SKIN: Skin color, texture, turgor normal, no rashes or lesions.  HEAD/FACE:  Normocephalic, atraumatic. Cranial nerves grossly intact.  NECK:  Minimal pain to palpation over the cervical paraspinous muscles. Spurling Negative.  Minimal pain with neck flexion, extension, and moderate with lateral flexion to the right.   CV: RRR with palpation of the radial artery.  PULM: No evidence of respiratory difficulty, symmetric chest rise.  GI:  Soft and non-tender.  EXTREMITIES: Peripheral joint ROM is full and pain free without obvious instability or laxity in all four extremities with the exception of left shoulder having limited abduction to 90° and flexion to 120°. No deformities, edema, or skin discoloration. Good capillary refill.  MUSCULOSKELETAL:  Positive impingement signs of the left shoulder with Collier, Neer's, and empty can.  Also with positive Birmingham's test on the left. .   Bilateral upper and lower extremity strength is normal and symmetric.  No atrophy or tone abnormalities are " noted.  NEURO: Bilateral upper and lower extremity coordination and muscle stretch reflexes are physiologic and symmetric.  Plantar response are downgoing. No clonus.  No loss of sensation is noted.  GAIT: normal.      LABS:  Lab Results   Component Value Date    WBC 6.66 11/18/2019    HGB 16.8 11/18/2019    HCT 50.9 11/18/2019    MCV 91 11/18/2019     11/18/2019       CMP  Sodium   Date Value Ref Range Status   11/18/2019 141 136 - 145 mmol/L Final     Potassium   Date Value Ref Range Status   11/18/2019 4.7 3.5 - 5.1 mmol/L Final     Chloride   Date Value Ref Range Status   11/18/2019 107 95 - 110 mmol/L Final     CO2   Date Value Ref Range Status   11/18/2019 27 23 - 29 mmol/L Final     Glucose   Date Value Ref Range Status   11/18/2019 96 70 - 110 mg/dL Final     BUN   Date Value Ref Range Status   11/18/2019 20 6 - 20 mg/dL Final     Creatinine   Date Value Ref Range Status   11/18/2019 1.0 0.5 - 1.4 mg/dL Final     Calcium   Date Value Ref Range Status   11/18/2019 9.7 8.7 - 10.5 mg/dL Final     Total Protein   Date Value Ref Range Status   11/18/2019 6.6 6.0 - 8.4 g/dL Final     Albumin   Date Value Ref Range Status   11/18/2019 4.0 3.5 - 5.2 g/dL Final     Total Bilirubin   Date Value Ref Range Status   11/18/2019 0.5 0.1 - 1.0 mg/dL Final     Comment:     For infants and newborns, interpretation of results should be based  on gestational age, weight and in agreement with clinical  observations.  Premature Infant recommended reference ranges:  Up to 24 hours.............<8.0 mg/dL  Up to 48 hours............<12.0 mg/dL  3-5 days..................<15.0 mg/dL  6-29 days.................<15.0 mg/dL       Alkaline Phosphatase   Date Value Ref Range Status   11/18/2019 68 55 - 135 U/L Final     AST   Date Value Ref Range Status   11/18/2019 16 10 - 40 U/L Final     ALT   Date Value Ref Range Status   11/18/2019 15 10 - 44 U/L Final     Anion Gap   Date Value Ref Range Status   11/18/2019 7 (L) 8 - 16  mmol/L Final     eGFR if    Date Value Ref Range Status   11/18/2019 >60 >60 mL/min/1.73 m^2 Final     eGFR if non    Date Value Ref Range Status   11/18/2019 >60 >60 mL/min/1.73 m^2 Final     Comment:     Calculation used to obtain the estimated glomerular filtration  rate (eGFR) is the CKD-EPI equation.          No results found for: LABA1C, HGBA1C          ASSESSMENT: 55 y.o. year old male with left shoulder pain, consistent with     1. Labral tear of shoulder, degenerative, left     2. Impingement syndrome of left shoulder  Ambulatory referral/consult to Pain Clinic    IR Aspiration Injection Large Joint W FL    Case Request-RAD/Other Procedure Area: Left shoulder Joint injection   3. Chronic left shoulder pain  Ambulatory referral/consult to Pain Clinic    IR Aspiration Injection Large Joint W FL    Case Request-RAD/Other Procedure Area: Left shoulder Joint injection   4. Tendonitis of left rotator cuff  Ambulatory referral/consult to Pain Clinic         PLAN:   - Interventions: Scheduled for left glenohumeral joint injection under fluoroscopy for diagnostic and therapeutic purposes as I believe a majority of his pain is stemming from the labral tear.. Explained the risks and benefits of the procedure in detail with the patient today in clinic along with alternative treatment options, and the patient elected to pursue the intervention.  If there is not significant relief with the glenohumeral joint injection, will consider suprascapular and axillary nerve blocks with potential RFA in the future.  - Anticoagulation: yes, aspirin no need to hold  - Medications: I have stressed the importance of physical activity and a home exercise plan to help with pain and improve health. and Patient can continue with medications for now since they are providing benefits, using them appropriately, and without side effects.  - Therapy:  Advised patient continue with activities and exercises as  tolerated  - Psychological:  Discussed coping mechanisms to help address chronic pain issues  - Labs:  Reviewed  - Imaging:  Reviewed imaging available  - Consults/Referrals:  None at this time  - Records:  Reviewed/Obtain old records from outside physicians and imaging  - Follow up visit: return to clinic in 4 weeks post procedure  - Counseled patient regarding the importance of activity modification and physical therapy  - This condition does not require this patient to take time off of work, and the primary goal of our Pain Management services is to improve the patient's functional capacity.  - Patient Questions: Answered all of the patient's questions regarding diagnosis, therapy, and treatment    The above plan and management options were discussed at length with patient. Patient is in agreement with the above and verbalized understanding.      Tanmay Weathers MD  Interventional Pain Management  [facility]    Disclaimer:  This note was prepared using voice recognition system and is likely to have sound alike errors that may have been overlooked even after proof reading.  Please call me with any questions

## 2020-12-16 NOTE — H&P (VIEW-ONLY)
Established Patient Chronic Pain Note (Follow up visit)    Chief Complaint:   Chief Complaint   Patient presents with    Shoulder Pain     left       SUBJECTIVE:    Colby Yepez is a 55 y.o. male who presents to the clinic for a follow-up appointment for left shoulder pain.  He was last seen in the clinic about 1 year ago for similar complaints.  He has since undergone a left shoulder scope with biceps tenodesis, and was recently followed up by Orthopedics who referred him back to our clinic for further evaluation and management.  Since the last visit, Colby Yepez states the pain has been persistant. Current pain intensity is 5/10.    Patient denies night fever/night sweats, urinary incontinence, bowel incontinence, significant weight loss, significant motor weakness and loss of sensations.    Pain Disability Index Review:  Last 3 PDI Scores 7/25/2019   Pain Disability Index (PDI) 30     Interval HPI 07/25/2019:  This patient is a 54 y.o. male who presents today complaining of chronic left shoulder pain. The patient describes this pain as follows.     - duration of pain: > 1 year   - timing: constant   - character: aching, pulling  - radiating, dermatomal: non-radiating for most part, occasionally extends into right forearm, mostly localized in shoulder joint  - antecedent trauma, prior spinal surgery: patient reports prior trauma, no prior spinal surgery   - other surgeries: none  - pertinent negatives: No fever, No chills, No weight loss, No bladder dysfunction, No bowel dysfunction, No extremity weakness, No saddle anesthesia  - pertinent positives: none        Non-Pharmacologic Treatments:  Physical Therapy/Home Exercise: yes  Ice/Heat:yes  TENS: no  Acupuncture: no  Massage: no  Chiropractic: no    Other: no    Pain Medications:  - Opioids:  Norco, tramadol  - Adjuvant Medications: Tylenol, Zanaflex, Mobic, Advil, Voltaren gel  - Anti-Coagulants: Aspirin    Opioid Contract:  No     report:   Reviewed and consistent with medication use as prescribed.        Pain Procedures:   -left glenohumeral joint injection on 04/17/2019, 60% relief  -left glenohumeral joint injection on 07/25/2019, limited relief  - left shoulder arthroscopy with biceps tenodesis in 2019  - left subacromial bursa injection 08/19/2020, limited relief  -left AC joint injection 10/07/2020, limited relief    Imaging:   X-ray cervical spine 11/10/2020:  Vertebral body heights and alignment are within normal limits.  There is mild disc height loss at C5-6 which appears slightly worsened from prior.  Posterior elements appear intact without acute fractures or subluxations demonstrated.  Odontoid process appears intact.  Atlantoaxial articulations appear normal.  Prevertebral soft tissues are within normal limits.    MRI arthrogram left shoulder 11/09/2020:  Mild acromioclavicular arthrosis with associated synovial hypertrophy and subchondral cystic change at the acromion.  There is an os acromiale.  8 mm ganglion cyst arising from the cephalad margin of the acromioclavicular joint.  Type 1 acromion.     Mild thickening and intermediate signal throughout the supraspinatus and infraspinatus tendons consistent with mild tendinosis.  The subscapularis and teres minor tendons are within normal limits.  No evidence of full-thickness rotator cuff tear.     Satisfactory opacification of the glenohumeral joint with gadolinium contrast.  Full-thickness posterior labral tear beginning at the 3 o'clock position with extension to the anterior inferior labrum at approximately the 7 o'clock position.     Complete biceps tendon tear from the biceps labral complex with retraction beyond the field of view.     The inferior glenohumeral ligament complex is within normal limits.     No full-thickness chondral defect identified.    X-ray left shoulder 08/19/2020:  No fracture or dislocation.  Joint spaces maintained with minimal AC joint degenerative findings.   Well-defined 8 mm lucency noted within the proximal left humerus.  Correlate for any prior surgery.  Lung parenchyma clear.    PMHx,PSHx, Social history, and Family history:  I have reviewed the patient's medical, surgical, social, and family history in detail and updated the computerized patient record.    Review of patient's allergies indicates:   Allergen Reactions    Tramadol Other (See Comments)     dizziness       Current Outpatient Medications   Medication Sig    diclofenac sodium (VOLTAREN) 1 % Gel Apply 2 g topically 2 (two) times daily.    ibuprofen (ADVIL,MOTRIN) 800 MG tablet TAKE ONE TABLET BY MOUTH THREE TIMES DAILY AS NEEDED FOR PAIN (Patient taking differently: Take 400 mg by mouth every 6 (six) hours as needed. Hold 5 days prior to surgery)    meloxicam (MOBIC) 15 MG tablet Take 1 tablet (15 mg total) by mouth once daily.    metronidazole 0.75% (METROCREAM) 0.75 % Crea ASTON EXT AA BID    multivitamin (ONE DAILY MULTIVITAMIN) per tablet Take 1 tablet by mouth once daily. Hold 5 days prior to surgery    tiZANidine (ZANAFLEX) 4 MG tablet Take 1 tablet (4 mg total) by mouth 3 (three) times daily as needed.    aspirin (ECOTRIN) 81 MG EC tablet Take 1 tablet (81 mg total) by mouth once daily. With food    diazePAM (VALIUM) 5 MG tablet Take 1 tablet (5 mg total) by mouth once. for 1 dose    metroNIDAZOLE (FLAGYL) 250 MG tablet     thyroid, pork, (ARMOUR THYROID) 60 mg Tab Take 60 mg by mouth before breakfast.     No current facility-administered medications for this visit.      Facility-Administered Medications Ordered in Other Visits   Medication    lidocaine (PF) 10 mg/ml (1%) injection 5 mg         REVIEW OF SYSTEMS:    GENERAL:  No weight loss, malaise or fevers.  HEENT:   No recent changes in vision or hearing  NECK:  Negative for lumps, no difficulty with swallowing.  RESPIRATORY:  Negative for cough, wheezing or shortness of breath, patient denies any recent URI.  CARDIOVASCULAR:   "Negative for chest pain, leg swelling or palpitations.  GI:  Negative for abdominal discomfort, blood in stools or black stools or change in bowel habits.  MUSCULOSKELETAL:  See HPI.  SKIN:  Negative for lesions, rash, and itching.  PSYCH:  No mood disorder or recent psychosocial stressors.  Patients sleep is not disturbed secondary to pain.  HEMATOLOGY/LYMPHOLOGY:  Negative for prolonged bleeding, bruising easily or swollen nodes.  Patient is currently taking anti-coagulants - ASA  NEURO:   No history of headaches, syncope, paralysis, seizures or tremors.  All other reviewed and negative other than HPI.    OBJECTIVE:    /74 (BP Location: Right arm, Patient Position: Sitting, BP Method: Medium (Automatic))   Pulse 73   Resp 18   Ht 6' 2" (1.88 m)   Wt 109.8 kg (242 lb)   BMI 31.07 kg/m²     PHYSICAL EXAMINATION:    GENERAL: Well appearing, in no acute distress, alert and oriented x3.  PSYCH:  Mood and affect appropriate.  SKIN: Skin color, texture, turgor normal, no rashes or lesions.  HEAD/FACE:  Normocephalic, atraumatic. Cranial nerves grossly intact.  NECK:  Minimal pain to palpation over the cervical paraspinous muscles. Spurling Negative.  Minimal pain with neck flexion, extension, and moderate with lateral flexion to the right.   CV: RRR with palpation of the radial artery.  PULM: No evidence of respiratory difficulty, symmetric chest rise.  GI:  Soft and non-tender.  EXTREMITIES: Peripheral joint ROM is full and pain free without obvious instability or laxity in all four extremities with the exception of left shoulder having limited abduction to 90° and flexion to 120°. No deformities, edema, or skin discoloration. Good capillary refill.  MUSCULOSKELETAL:  Positive impingement signs of the left shoulder with Collier, Neer's, and empty can.  Also with positive Stratton's test on the left. .   Bilateral upper and lower extremity strength is normal and symmetric.  No atrophy or tone abnormalities are " noted.  NEURO: Bilateral upper and lower extremity coordination and muscle stretch reflexes are physiologic and symmetric.  Plantar response are downgoing. No clonus.  No loss of sensation is noted.  GAIT: normal.      LABS:  Lab Results   Component Value Date    WBC 6.66 11/18/2019    HGB 16.8 11/18/2019    HCT 50.9 11/18/2019    MCV 91 11/18/2019     11/18/2019       CMP  Sodium   Date Value Ref Range Status   11/18/2019 141 136 - 145 mmol/L Final     Potassium   Date Value Ref Range Status   11/18/2019 4.7 3.5 - 5.1 mmol/L Final     Chloride   Date Value Ref Range Status   11/18/2019 107 95 - 110 mmol/L Final     CO2   Date Value Ref Range Status   11/18/2019 27 23 - 29 mmol/L Final     Glucose   Date Value Ref Range Status   11/18/2019 96 70 - 110 mg/dL Final     BUN   Date Value Ref Range Status   11/18/2019 20 6 - 20 mg/dL Final     Creatinine   Date Value Ref Range Status   11/18/2019 1.0 0.5 - 1.4 mg/dL Final     Calcium   Date Value Ref Range Status   11/18/2019 9.7 8.7 - 10.5 mg/dL Final     Total Protein   Date Value Ref Range Status   11/18/2019 6.6 6.0 - 8.4 g/dL Final     Albumin   Date Value Ref Range Status   11/18/2019 4.0 3.5 - 5.2 g/dL Final     Total Bilirubin   Date Value Ref Range Status   11/18/2019 0.5 0.1 - 1.0 mg/dL Final     Comment:     For infants and newborns, interpretation of results should be based  on gestational age, weight and in agreement with clinical  observations.  Premature Infant recommended reference ranges:  Up to 24 hours.............<8.0 mg/dL  Up to 48 hours............<12.0 mg/dL  3-5 days..................<15.0 mg/dL  6-29 days.................<15.0 mg/dL       Alkaline Phosphatase   Date Value Ref Range Status   11/18/2019 68 55 - 135 U/L Final     AST   Date Value Ref Range Status   11/18/2019 16 10 - 40 U/L Final     ALT   Date Value Ref Range Status   11/18/2019 15 10 - 44 U/L Final     Anion Gap   Date Value Ref Range Status   11/18/2019 7 (L) 8 - 16  mmol/L Final     eGFR if    Date Value Ref Range Status   11/18/2019 >60 >60 mL/min/1.73 m^2 Final     eGFR if non    Date Value Ref Range Status   11/18/2019 >60 >60 mL/min/1.73 m^2 Final     Comment:     Calculation used to obtain the estimated glomerular filtration  rate (eGFR) is the CKD-EPI equation.          No results found for: LABA1C, HGBA1C          ASSESSMENT: 55 y.o. year old male with left shoulder pain, consistent with     1. Labral tear of shoulder, degenerative, left     2. Impingement syndrome of left shoulder  Ambulatory referral/consult to Pain Clinic    IR Aspiration Injection Large Joint W FL    Case Request-RAD/Other Procedure Area: Left shoulder Joint injection   3. Chronic left shoulder pain  Ambulatory referral/consult to Pain Clinic    IR Aspiration Injection Large Joint W FL    Case Request-RAD/Other Procedure Area: Left shoulder Joint injection   4. Tendonitis of left rotator cuff  Ambulatory referral/consult to Pain Clinic         PLAN:   - Interventions: Scheduled for left glenohumeral joint injection under fluoroscopy for diagnostic and therapeutic purposes as I believe a majority of his pain is stemming from the labral tear.. Explained the risks and benefits of the procedure in detail with the patient today in clinic along with alternative treatment options, and the patient elected to pursue the intervention.  If there is not significant relief with the glenohumeral joint injection, will consider suprascapular and axillary nerve blocks with potential RFA in the future.  - Anticoagulation: yes, aspirin no need to hold  - Medications: I have stressed the importance of physical activity and a home exercise plan to help with pain and improve health. and Patient can continue with medications for now since they are providing benefits, using them appropriately, and without side effects.  - Therapy:  Advised patient continue with activities and exercises as  tolerated  - Psychological:  Discussed coping mechanisms to help address chronic pain issues  - Labs:  Reviewed  - Imaging:  Reviewed imaging available  - Consults/Referrals:  None at this time  - Records:  Reviewed/Obtain old records from outside physicians and imaging  - Follow up visit: return to clinic in 4 weeks post procedure  - Counseled patient regarding the importance of activity modification and physical therapy  - This condition does not require this patient to take time off of work, and the primary goal of our Pain Management services is to improve the patient's functional capacity.  - Patient Questions: Answered all of the patient's questions regarding diagnosis, therapy, and treatment    The above plan and management options were discussed at length with patient. Patient is in agreement with the above and verbalized understanding.      Tanmay Weathers MD  Interventional Pain Management  [facility]    Disclaimer:  This note was prepared using voice recognition system and is likely to have sound alike errors that may have been overlooked even after proof reading.  Please call me with any questions

## 2020-12-16 NOTE — PATIENT INSTRUCTIONS
Pain Management Pre-Procedure Instructions  (also available in your [employer] account)    Patient Name:___Colby Yepez____MRN: [id number] you are scheduled to have the following procedure:__ Joint Injection  _with______Tanmay Weathers MD on: ____12/28/2020___ at: [facility]    You will be contacted the day before your procedure to be given an arrival and procedure time                                                                                                            Day of Procedure   Ensure you have obtained arrival time from the Pain Management department  o We will call 48 hours in advance with your arrival time. Please check any voicemails you may have  o If you arrive past your scheduled procedure time, you may be asked to reschedule your procedure.   For your safety, ensure you have a  with you to remain present throughout your procedure   o If you arrive without a responsible adult to stay with you and drive you home, you may be asked to reschedule your procedure   Take all of your prescribed medications (exceptions noted below) with a small amount of water  o Stop all insulin and blood sugar medication night before and day of , take other medications as prescribed   o [x] Nothing by mouth after midnight the night before your procedure.  It is ok to take your regular medications with a small sip of water.     Wear loose, comfortable clothing    You may wear glasses, dentures, contact lenses and/or hearing aids. Please leave all valuable items at home.   Contact the Pain Management department at [phone] or via [employer] if you are:  o Running a fever above 100 degrees  o Feel ill, have any type of infection, or are taking antibiotics now or have in the past 2 weeks  o Have had any outpatient procedures in the past 2 weeks (colonoscopy, major dental work, etc.)  o If you are allergic to iodine, IVP dye or shellfish.      Contact Information: [phone], ask to  speak to the pain management department with any questions or concerns or send a message via [employer]

## 2020-12-22 NOTE — PRE-PROCEDURE INSTRUCTIONS
Spoke with patient regarding procedure scheduled on 12/28    Arrival time 1040    Has patient been sick with fever or on antibiotics within the last 7 days? No    Does the patient have any open wounds, sores or rashes? No    Has patient received a vaccination within the last 7 days? No    Has the patient stopped all medications as directed? Na    Does patient have a pacemaker and or defibrillator? no    Does the patient have a ride to and from procedure and someone reliable to remain with patient? Wife Annetta [phone]    Is the patient diabetic? no    Does the patient have sleep apnea? Or use O2 at home? No and no     Is the patient receiving sedation? yes    Is the patient instructed to remain NPO beginning at midnight the night before their procedure? yes    Procedure location confirmed with patient? Yes    Covid- Denies signs/symptoms. Instructed to notify PAT/MD if any changes.

## 2020-12-24 ENCOUNTER — HOSPITAL ENCOUNTER (OUTPATIENT)
Dept: RADIOLOGY | Facility: HOSPITAL | Age: 55
Discharge: HOME | End: 2020-12-24
Attending: FAMILY MEDICINE
Payer: OTHER GOVERNMENT

## 2020-12-24 DIAGNOSIS — M54.9 DORSALGIA, UNSPECIFIED: ICD-10-CM

## 2020-12-24 PROCEDURE — 72100 X-RAY EXAM L-S SPINE 2/3 VWS: CPT | Mod: 26,,, | Performed by: RADIOLOGY

## 2020-12-24 PROCEDURE — 72100 XR LUMBAR SPINE AP AND LATERAL: ICD-10-PCS | Mod: 26,,, | Performed by: RADIOLOGY

## 2020-12-24 PROCEDURE — 72100 X-RAY EXAM L-S SPINE 2/3 VWS: CPT | Mod: TC,PO

## 2020-12-28 ENCOUNTER — HOSPITAL ENCOUNTER (OUTPATIENT)
Facility: HOSPITAL | Age: 55
Discharge: HOME | End: 2020-12-28
Attending: PHYSICAL MEDICINE & REHABILITATION | Admitting: PHYSICAL MEDICINE & REHABILITATION
Payer: OTHER GOVERNMENT

## 2020-12-28 VITALS
HEIGHT: 74 IN | OXYGEN SATURATION: 97 % | WEIGHT: 242.5 LBS | TEMPERATURE: 98 F | SYSTOLIC BLOOD PRESSURE: 112 MMHG | RESPIRATION RATE: 18 BRPM | BODY MASS INDEX: 31.12 KG/M2 | HEART RATE: 70 BPM | DIASTOLIC BLOOD PRESSURE: 74 MMHG

## 2020-12-28 DIAGNOSIS — M19.019 GLENOHUMERAL ARTHRITIS, UNSPECIFIED LATERALITY: ICD-10-CM

## 2020-12-28 PROCEDURE — 77002 NEEDLE LOCALIZATION BY XRAY: CPT | Mod: 26,,, | Performed by: PHYSICAL MEDICINE & REHABILITATION

## 2020-12-28 PROCEDURE — 25000003 PHARM REV CODE 250: Performed by: PHYSICAL MEDICINE & REHABILITATION

## 2020-12-28 PROCEDURE — 25500020 PHARM REV CODE 255: Performed by: PHYSICAL MEDICINE & REHABILITATION

## 2020-12-28 PROCEDURE — 20610 DRAIN/INJ JOINT/BURSA W/O US: CPT | Performed by: PHYSICAL MEDICINE & REHABILITATION

## 2020-12-28 PROCEDURE — 20610 DRAIN/INJ JOINT/BURSA W/O US: CPT | Mod: LT,,, | Performed by: PHYSICAL MEDICINE & REHABILITATION

## 2020-12-28 PROCEDURE — 63600175 PHARM REV CODE 636 W HCPCS: Performed by: PHYSICAL MEDICINE & REHABILITATION

## 2020-12-28 PROCEDURE — 77002 PR FLUOROSCOPIC GUIDANCE NEEDLE PLACEMENT: ICD-10-PCS | Mod: 26,,, | Performed by: PHYSICAL MEDICINE & REHABILITATION

## 2020-12-28 PROCEDURE — 20610 PR DRAIN/INJECT LARGE JOINT/BURSA: ICD-10-PCS | Mod: LT,,, | Performed by: PHYSICAL MEDICINE & REHABILITATION

## 2020-12-28 PROCEDURE — 99152 MOD SED SAME PHYS/QHP 5/>YRS: CPT | Performed by: PHYSICAL MEDICINE & REHABILITATION

## 2020-12-28 RX ORDER — METHYLPREDNISOLONE ACETATE 40 MG/ML
INJECTION, SUSPENSION INTRA-ARTICULAR; INTRALESIONAL; INTRAMUSCULAR; SOFT TISSUE
Status: DISCONTINUED | OUTPATIENT
Start: 2020-12-28 | End: 2020-12-28 | Stop reason: HOSPADM

## 2020-12-28 RX ORDER — ONDANSETRON 2 MG/ML
4 INJECTION INTRAMUSCULAR; INTRAVENOUS ONCE AS NEEDED
Status: DISCONTINUED | OUTPATIENT
Start: 2020-12-28 | End: 2022-05-06

## 2020-12-28 RX ORDER — BUPIVACAINE HYDROCHLORIDE 2.5 MG/ML
INJECTION, SOLUTION EPIDURAL; INFILTRATION; INTRACAUDAL
Status: DISCONTINUED | OUTPATIENT
Start: 2020-12-28 | End: 2020-12-28 | Stop reason: HOSPADM

## 2020-12-28 RX ORDER — FENTANYL CITRATE 50 UG/ML
INJECTION, SOLUTION INTRAMUSCULAR; INTRAVENOUS
Status: DISCONTINUED | OUTPATIENT
Start: 2020-12-28 | End: 2020-12-28 | Stop reason: HOSPADM

## 2020-12-28 RX ORDER — MIDAZOLAM HYDROCHLORIDE 1 MG/ML
INJECTION, SOLUTION INTRAMUSCULAR; INTRAVENOUS
Status: DISCONTINUED | OUTPATIENT
Start: 2020-12-28 | End: 2020-12-28 | Stop reason: HOSPADM

## 2020-12-28 NOTE — DISCHARGE INSTRUCTIONS
1. Side effects may include: headache, restlessness at night, weakness to upper or lower extremities (arms or legs), flushing of the face and/ or neck. None are life threatening and will subside within 2-3 days.   2. If you have SEVERE headache with nausea and extreme pain, please call office ([phone]). Unless you usually have this type of migraine headache.   3. If you develop fever (greater than 101 F) or have any redness, swelling, or drainage at the injection site(s), please call off ([phone]). This may be a sign of infection.   4. If a rash or whelps (like hives) occur, please call the office ([phone]).  5. If you are a heart patient, please watch for symptoms such as swelling in your hands and feet and shortness of breath. If any of these symptoms occur, please notify your primary care/ heart doctor and go to the nearest emergency room.  6. If you have high blood pressure, you may experience an increase in your blood pressure over the next two weeks. Please continue to monitor your blood pressure and contact your primary care provider if your blood pressure does not return to baseline.    7. If you are a diabetic or you monitor your blood sugar, you may have an increase in your blood sugar over the next two weeks. If your blood sugar does not return to your baseline, please contact your primary care provider.  8. NO HEAT TO THE INJECTION SITE for the next 24 hours including: bath or shower, heating pad, moist heat, and / or hot tubs.   9. May use ice pack to injection site for any pain or discomfort. Apply ice pack for 20 minutes then remove for 20 minutes before re- applying to site. (recipe for homemade frozen gel pack below)  10. DO NOT DRIVE OR OPERATE HEAVY MACHINERY UNTIL TOMORROW MORNING.   11. OK to resume all medications unless otherwise directed by your doctor.  12. Injection(s) may take up to two weeks until full effects are noted.  13. You may return to normal activity/ work the  following day.  14. Please do not receive a flu or pneumonia vaccine until one week after your procedure.  .  Homemade Frozen Gel Ice Pack:  1 bottle of rubbing alcohol  3 bottles of water (use rubbing alcohol bottle to measure)  2 large zip lock bags    Instructions:  1. Pour alcohol and water into zip lock bag. Make sure bag is large enough to allow for expansion.  2. Try to get as much air out of the freezer bag before sealing it shut.   3. Place the bag and its contents inside a second freezer bag to contain any leakage.   4. Leave the bag in the freezer for at least one hour.  5. When it's ready, place a towel between the gel pack and bare skin to avoid burning the skin.

## 2020-12-28 NOTE — PLAN OF CARE
Pt and spouse verbalized understanding of discharge instructions. Bandaid x 1 to L shoulder c/d/i. Patient voiced no complaints, with no further questions at this time. Patient stood at side of bed, walked steps with no new motor deficits. VSS on RA. Recovery care complete.

## 2020-12-28 NOTE — DISCHARGE SUMMARY
Discharge Note  Short Stay      SUMMARY     Admit Date: 12/28/2020    Attending Physician: Tanmay Weathers MD        Discharge Physician: Tanmay Weathers MD        Discharge Date: 12/28/2020 12:21 PM    Procedure(s) (LRB):  Left shoulder Joint injection (Left)    Final Diagnosis: Impingement syndrome of left shoulder [M75.42]  Chronic left shoulder pain [M25.512, G89.29]    Disposition: Home or self care    Patient Instructions:   Current Discharge Medication List      CONTINUE these medications which have NOT CHANGED    Details   ibuprofen (ADVIL,MOTRIN) 800 MG tablet TAKE ONE TABLET BY MOUTH THREE TIMES DAILY AS NEEDED FOR PAIN  Qty: 60 tablet, Refills: 5      aspirin (ECOTRIN) 81 MG EC tablet Take 1 tablet (81 mg total) by mouth once daily. With food  Qty: 30 tablet, Refills: 0      diazePAM (VALIUM) 5 MG tablet Take 1 tablet (5 mg total) by mouth once. for 1 dose  Qty: 1 tablet, Refills: 0    Comments: Take 30 minutes prior to MRI      diclofenac sodium (VOLTAREN) 1 % Gel Apply 2 g topically 2 (two) times daily.  Qty: 1 Tube, Refills: 0      meloxicam (MOBIC) 15 MG tablet Take 1 tablet (15 mg total) by mouth once daily.  Qty: 30 tablet, Refills: 1    Associated Diagnoses: Neck pain; Lumbar pain      metronidazole 0.75% (METROCREAM) 0.75 % Crea ASTON EXT AA BID      multivitamin (ONE DAILY MULTIVITAMIN) per tablet Take 1 tablet by mouth once daily. Hold 5 days prior to surgery                 Discharge Diagnosis: Impingement syndrome of left shoulder [M75.42]  Chronic left shoulder pain [M25.512, G89.29]  Condition on Discharge: Stable with no complications to procedure   Diet on Discharge: Same as before.  Activity: as per instruction sheet.  Discharge to: Home with a responsible adult.  Follow up: 2-4 weeks       Please call the office if you experience any weakness or loss of sensation, fever > 101.5, pain uncontrolled with oral medications, persistent nausea/vomiting/or diarrhea, redness or drainage from  the incisions, or any other worrisome concerns. If physician on call was not reached or could not communicate with our office for any reason please go to the nearest emergency department

## 2020-12-28 NOTE — INTERVAL H&P NOTE
The patient has been examined and the H&P has been reviewed:    I concur with the findings and no changes have occurred since H&P was written.    Anesthesia/Surgery risks, benefits and alternative options discussed and understood by patient/family.          Active Hospital Problems    Diagnosis  POA    Glenohumeral arthritis, unspecified laterality [M19.019]  Yes      Resolved Hospital Problems   No resolved problems to display.

## 2021-01-21 ENCOUNTER — HOSPITAL ENCOUNTER (OUTPATIENT)
Dept: RADIOLOGY | Facility: HOSPITAL | Age: 56
Discharge: HOME | End: 2021-01-21
Attending: FAMILY MEDICINE
Payer: OTHER GOVERNMENT

## 2021-01-21 DIAGNOSIS — M54.9 DORSALGIA, UNSPECIFIED: ICD-10-CM

## 2021-01-21 PROCEDURE — 72148 MRI LUMBAR SPINE WITHOUT CONTRAST: ICD-10-PCS | Mod: 26,,, | Performed by: RADIOLOGY

## 2021-01-21 PROCEDURE — 72148 MRI LUMBAR SPINE W/O DYE: CPT | Mod: TC

## 2021-01-21 PROCEDURE — 72148 MRI LUMBAR SPINE W/O DYE: CPT | Mod: 26,,, | Performed by: RADIOLOGY

## 2021-01-27 ENCOUNTER — PATIENT MESSAGE (OUTPATIENT)
Dept: PAIN MEDICINE | Facility: CLINIC | Age: 56
End: 2021-01-27

## 2021-01-27 ENCOUNTER — OFFICE VISIT (OUTPATIENT)
Dept: PAIN MEDICINE | Facility: CLINIC | Age: 56
End: 2021-01-27
Payer: OTHER GOVERNMENT

## 2021-01-27 VITALS
BODY MASS INDEX: 30.8 KG/M2 | HEART RATE: 68 BPM | WEIGHT: 240 LBS | HEIGHT: 74 IN | DIASTOLIC BLOOD PRESSURE: 69 MMHG | SYSTOLIC BLOOD PRESSURE: 107 MMHG | RESPIRATION RATE: 18 BRPM

## 2021-01-27 DIAGNOSIS — M75.42 IMPINGEMENT SYNDROME OF LEFT SHOULDER: ICD-10-CM

## 2021-01-27 DIAGNOSIS — M51.36 DDD (DEGENERATIVE DISC DISEASE), LUMBAR: ICD-10-CM

## 2021-01-27 DIAGNOSIS — M54.16 RIGHT LUMBAR RADICULOPATHY: Primary | ICD-10-CM

## 2021-01-27 DIAGNOSIS — M24.112 LABRAL TEAR OF SHOULDER, DEGENERATIVE, LEFT: ICD-10-CM

## 2021-01-27 PROCEDURE — 99215 PR OFFICE/OUTPT VISIT, EST, LEVL V, 40-54 MIN: ICD-10-PCS | Mod: S$PBB,,, | Performed by: PHYSICIAN ASSISTANT

## 2021-01-27 PROCEDURE — 99999 PR PBB SHADOW E&M-EST. PATIENT-LVL IV: CPT | Mod: PBBFAC,,, | Performed by: PHYSICIAN ASSISTANT

## 2021-01-27 PROCEDURE — 99214 OFFICE O/P EST MOD 30 MIN: CPT | Mod: PBBFAC | Performed by: PHYSICIAN ASSISTANT

## 2021-01-27 PROCEDURE — 99999 PR PBB SHADOW E&M-EST. PATIENT-LVL IV: ICD-10-PCS | Mod: PBBFAC,,, | Performed by: PHYSICIAN ASSISTANT

## 2021-01-27 PROCEDURE — 99215 OFFICE O/P EST HI 40 MIN: CPT | Mod: S$PBB,,, | Performed by: PHYSICIAN ASSISTANT

## 2021-02-08 ENCOUNTER — TELEPHONE (OUTPATIENT)
Dept: ORTHOPEDICS | Facility: CLINIC | Age: 56
End: 2021-02-08

## 2021-02-18 ENCOUNTER — TELEPHONE (OUTPATIENT)
Dept: PAIN MEDICINE | Facility: CLINIC | Age: 56
End: 2021-02-18

## 2021-02-19 ENCOUNTER — TELEPHONE (OUTPATIENT)
Dept: PAIN MEDICINE | Facility: CLINIC | Age: 56
End: 2021-02-19

## 2021-03-04 ENCOUNTER — TELEPHONE (OUTPATIENT)
Dept: PAIN MEDICINE | Facility: CLINIC | Age: 56
End: 2021-03-04

## 2021-03-04 ENCOUNTER — HOSPITAL ENCOUNTER (OUTPATIENT)
Facility: HOSPITAL | Age: 56
Discharge: HOME | End: 2021-03-04
Attending: PHYSICAL MEDICINE & REHABILITATION | Admitting: PHYSICAL MEDICINE & REHABILITATION
Payer: OTHER GOVERNMENT

## 2021-03-04 VITALS
TEMPERATURE: 98 F | SYSTOLIC BLOOD PRESSURE: 106 MMHG | HEIGHT: 74 IN | BODY MASS INDEX: 30.33 KG/M2 | DIASTOLIC BLOOD PRESSURE: 68 MMHG | OXYGEN SATURATION: 95 % | RESPIRATION RATE: 18 BRPM | WEIGHT: 236.31 LBS | HEART RATE: 71 BPM

## 2021-03-04 DIAGNOSIS — M54.16 LUMBAR RADICULOPATHY: Primary | ICD-10-CM

## 2021-03-04 PROCEDURE — 25500020 PHARM REV CODE 255: Performed by: PHYSICAL MEDICINE & REHABILITATION

## 2021-03-04 PROCEDURE — 64484 NJX AA&/STRD TFRM EPI L/S EA: CPT | Performed by: PHYSICAL MEDICINE & REHABILITATION

## 2021-03-04 PROCEDURE — 64483 NJX AA&/STRD TFRM EPI L/S 1: CPT | Performed by: PHYSICAL MEDICINE & REHABILITATION

## 2021-03-04 PROCEDURE — 63600175 PHARM REV CODE 636 W HCPCS: Performed by: PHYSICAL MEDICINE & REHABILITATION

## 2021-03-04 PROCEDURE — 64484 NJX AA&/STRD TFRM EPI L/S EA: CPT | Mod: RT,,, | Performed by: PHYSICAL MEDICINE & REHABILITATION

## 2021-03-04 PROCEDURE — 64484 PRA INJECT ANES/STEROID FORAMEN LUMBAR/SACRAL W IMG GUIDE ,EA ADD LEVEL: ICD-10-PCS | Mod: RT,,, | Performed by: PHYSICAL MEDICINE & REHABILITATION

## 2021-03-04 PROCEDURE — 25000003 PHARM REV CODE 250: Performed by: PHYSICAL MEDICINE & REHABILITATION

## 2021-03-04 PROCEDURE — 64483 NJX AA&/STRD TFRM EPI L/S 1: CPT | Mod: RT,,, | Performed by: PHYSICAL MEDICINE & REHABILITATION

## 2021-03-04 PROCEDURE — 64483 PR EPIDURAL INJ, ANES/STEROID, TRANSFORAMINAL, LUMB/SACR, SNGL LEVL: ICD-10-PCS | Mod: RT,,, | Performed by: PHYSICAL MEDICINE & REHABILITATION

## 2021-03-04 RX ORDER — ONDANSETRON 2 MG/ML
4 INJECTION INTRAMUSCULAR; INTRAVENOUS ONCE AS NEEDED
Status: DISCONTINUED | OUTPATIENT
Start: 2021-03-04 | End: 2021-03-04 | Stop reason: HOSPADM

## 2021-03-04 RX ORDER — FENTANYL CITRATE 50 UG/ML
INJECTION, SOLUTION INTRAMUSCULAR; INTRAVENOUS
Status: DISCONTINUED | OUTPATIENT
Start: 2021-03-04 | End: 2021-03-04 | Stop reason: HOSPADM

## 2021-03-04 RX ORDER — METHYLPREDNISOLONE ACETATE 40 MG/ML
INJECTION, SUSPENSION INTRA-ARTICULAR; INTRALESIONAL; INTRAMUSCULAR; SOFT TISSUE
Status: DISCONTINUED | OUTPATIENT
Start: 2021-03-04 | End: 2021-03-04 | Stop reason: HOSPADM

## 2021-03-04 RX ORDER — BUPIVACAINE HYDROCHLORIDE 2.5 MG/ML
INJECTION, SOLUTION EPIDURAL; INFILTRATION; INTRACAUDAL
Status: DISCONTINUED | OUTPATIENT
Start: 2021-03-04 | End: 2021-03-04 | Stop reason: HOSPADM

## 2021-03-04 RX ORDER — MIDAZOLAM HYDROCHLORIDE 1 MG/ML
INJECTION, SOLUTION INTRAMUSCULAR; INTRAVENOUS
Status: DISCONTINUED | OUTPATIENT
Start: 2021-03-04 | End: 2021-03-04 | Stop reason: HOSPADM

## 2021-04-27 ENCOUNTER — TELEPHONE (OUTPATIENT)
Dept: ORTHOPEDICS | Facility: CLINIC | Age: 56
End: 2021-04-27

## 2021-05-10 ENCOUNTER — PATIENT MESSAGE (OUTPATIENT)
Dept: RESEARCH | Facility: HOSPITAL | Age: 56
End: 2021-05-10

## 2021-11-30 ENCOUNTER — HOSPITAL ENCOUNTER (OUTPATIENT)
Dept: RADIOLOGY | Facility: HOSPITAL | Age: 56
Discharge: HOME | End: 2021-11-30
Attending: FAMILY MEDICINE
Payer: OTHER GOVERNMENT

## 2021-11-30 DIAGNOSIS — R50.9 FEVER, UNSPECIFIED FEVER CAUSE: ICD-10-CM

## 2021-11-30 PROCEDURE — 71046 X-RAY EXAM CHEST 2 VIEWS: CPT | Mod: TC,PO

## 2021-11-30 PROCEDURE — 71046 X-RAY EXAM CHEST 2 VIEWS: CPT | Mod: 26,,, | Performed by: RADIOLOGY

## 2021-11-30 PROCEDURE — 71046 XR CHEST PA AND LATERAL: ICD-10-PCS | Mod: 26,,, | Performed by: RADIOLOGY

## 2021-12-09 ENCOUNTER — HOSPITAL ENCOUNTER (OUTPATIENT)
Dept: RADIOLOGY | Facility: HOSPITAL | Age: 56
Discharge: HOME | End: 2021-12-09
Attending: FAMILY MEDICINE
Payer: OTHER GOVERNMENT

## 2021-12-09 DIAGNOSIS — R50.9 FEVER, UNSPECIFIED FEVER CAUSE: ICD-10-CM

## 2021-12-09 PROCEDURE — 70486 CT MAXILLOFACIAL W/O DYE: CPT | Mod: TC

## 2021-12-09 PROCEDURE — 70486 CT MAXILLOFACIAL W/O DYE: CPT | Mod: 26,,, | Performed by: RADIOLOGY

## 2021-12-09 PROCEDURE — 70486 CT SINUSES WITHOUT CONTRAST: ICD-10-PCS | Mod: 26,,, | Performed by: RADIOLOGY

## 2022-02-22 NOTE — PROGRESS NOTES
Patient ID: Cloby Yepez  YOB: 1965  MRN: [id number]    Chief Complaint: Pain of the Left Shoulder    Referred By: former pt of Dr. 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 Patton. He is here today for a follow up for his left shoulder. Patient states that his pain is 5/10 today. He did receive an injection on 08/19/2020 and he states that he didn't get any relief from that at all. He is still in physical therapy at [facility], [place] and he states that he believes it helps. He is still having pain with no relief at all from the prior injection. Burning pain in the bicep, and pain located to the front of shoulder. Has tried the antiinflammatory cream, ice, and oral anti-inflammatories. Denies hx of neck pain. Denies fevers, chills, night sweats and numbness. States that he does have occasional numbness and tingling in the index finger comes and goes.     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 Patton.      Shoulder Pain   The pain is present in the left shoulder. This is a chronic problem. The problem occurs constantly. The problem has been unchanged. The quality of  the pain is described as aching, burning, sharp, shooting, dull and throbbing. The pain is at a severity of 5/10. Associated symptoms include joint locking, a limited range of motion and stiffness. Pertinent negatives include no fever or itching. The symptoms are aggravated by activity and lifting. He has tried cold and injection treatment for the symptoms. The treatment provided no relief. Physical therapy was effective.      Past Medical History:   Past Medical History:   Diagnosis Date    Allergy     Hypotension, iatrogenic     Kidney stone     Liver cyst     Obesity     Thyroid disease      Past Surgical History:   Procedure Laterality Date    ARTHROSCOPIC DEBRIDEMENT OF SHOULDER Left 12/9/2019    Procedure: DEBRIDEMENT, SHOULDER, ARTHROSCOPIC;  Surgeon: Lenny Patton MD;  Location: [facility];  Service: Orthopedics;  Laterality: Left;    ARTHROSCOPY OF SHOULDER WITH DECOMPRESSION OF SUBACROMIAL SPACE Left 12/9/2019    Procedure: ARTHROSCOPY, SHOULDER, WITH SUBACROMIAL SPACE DECOMPRESSION;  Surgeon: Lenny Patton MD;  Location: [facility];  Service: Orthopedics;  Laterality: Left;    BICEPS TENDON REPAIR Left 12/9/2019    Procedure: REPAIR, TENDON, BICEPS;  Surgeon: Lenny Patton MD;  Location: [facility] OR;  Service: Orthopedics;  Laterality: Left;  arthroscopic bicep tenodesis    CHONDROPLASTY OF SHOULDER Left 12/9/2019    Procedure: CHONDROPLASTY, SHOULDER;  Surgeon: Lenny Patton MD;  Location: [facility] OR;  Service: Orthopedics;  Laterality: Left;  arthroscopic    COLONOSCOPY N/A 5/13/2019    Procedure: COLONOSCOPY;  Surgeon: Soha Norton MD;  Location: [facility] ENDO;  Service: Endoscopy;  Laterality: N/A;    EXAMINATION UNDER ANESTHESIA Left 12/9/2019    Procedure: EXAM UNDER ANESTHESIA;  Surgeon: Lenny Patton MD;  Location: [facility];  Service: Orthopedics;  Laterality: Left;    GANGLION CYST EXCISION Left     INJECTION OF JOINT Left 7/25/2019    Procedure: Left shoulder Joint  injection with local;  Surgeon: Leonardo Collado MD;  Location: [facility] PAIN MGT;  Service: Pain Management;  Laterality: Left;    SINUS SURGERY  2007    TONSILLECTOMY, ADENOIDECTOMY, BILATERAL MYRINGOTOMY AND TUBES      VASECTOMY  10/27/2017     Family History   Problem Relation Age of Onset    Hypertension Mother     Prostate cancer Father     Hyperlipidemia Father      Social History     Socioeconomic History    Marital status:      Spouse name: Not on file    Number of children: 3    Years of education: Not on file    Highest education level: Not on file   Occupational History     Employer: [employer]   Social Needs    Financial resource strain: Not on file    Food insecurity     Worry: Not on file     Inability: Not on file    Transportation needs     Medical: Not on file     Non-medical: Not on file   Tobacco Use    Smoking status: Never Smoker    Smokeless tobacco: Never Used   Substance and Sexual Activity    Alcohol use: Yes     Frequency: 2-4 times a month     Drinks per session: 1 or 2     Binge frequency: Never     Comment: monthly    Drug use: No    Sexual activity: Not on file   Lifestyle    Physical activity     Days per week: Not on file     Minutes per session: Not on file    Stress: Not on file   Relationships    Social connections     Talks on phone: Not on file     Gets together: Not on file     Attends Worship service: Not on file     Active member of club or organization: Not on file     Attends meetings of clubs or organizations: Not on file     Relationship status: Not on file   Other Topics Concern    Not on file   Social History Narrative    Not on file     Medication List with Changes/Refills   Current Medications    ASPIRIN (ECOTRIN) 81 MG EC TABLET    Take 1 tablet (81 mg total) by mouth once daily. With food    DICLOFENAC SODIUM (VOLTAREN) 1 % GEL    Apply 2 g topically 2 (two) times daily.    IBUPROFEN (ADVIL,MOTRIN) 800 MG TABLET    TAKE ONE TABLET BY  MOUTH THREE TIMES DAILY AS NEEDED FOR PAIN    MULTIVITAMIN (ONE DAILY MULTIVITAMIN) PER TABLET    Take 1 tablet by mouth once daily. Hold 5 days prior to surgery    THYROID, PORK, (ARMOUR THYROID) 60 MG TAB    Take 60 mg by mouth before breakfast.    TIZANIDINE (ZANAFLEX) 4 MG TABLET    Take 1 tablet (4 mg total) by mouth 3 (three) times daily as needed.     Review of patient's allergies indicates:   Allergen Reactions    Tramadol Other (See Comments)     dizziness     Review of Systems   Constitution: Negative for fever.   HENT: Negative for sore throat.    Eyes: Negative for blurred vision.   Cardiovascular: Negative for dyspnea on exertion.   Respiratory: Negative for shortness of breath.    Hematologic/Lymphatic: Does not bruise/bleed easily.   Skin: Negative for itching.   Musculoskeletal: Positive for joint pain, muscle cramps, muscle weakness and stiffness.   Gastrointestinal: Negative for vomiting.   Genitourinary: Negative for dysuria.   Neurological: Negative for dizziness and loss of balance.   Psychiatric/Behavioral: The patient does not have insomnia.        Physical Exam:   Body mass index is 31.2 kg/m².  Vitals:    09/30/20 0755   BP: 109/71   Pulse: 69      GENERAL: Well appearing, appropriate for stated age, no acute distress.  CARDIOVASCULAR: Pulses regular by peripheral palpation.  PULMONARY: Respirations are even and non-labored.  NEURO: Awake, alert, and oriented x 3.  PSYCH: Mood & affect are appropriate.  HEENT: Head is normocephalic and atraumatic.  General    Nursing note and vitals reviewed.        Right Shoulder Exam     Crepitus   The patient has crepitus of the trapezium.    Range of Motion   Active abduction: 120   Forward Elevation: 140  External Rotation 0 degrees: 50   Internal rotation 0 degrees: T10     Other   Sensation: normal    Left Shoulder Exam     Range of Motion   Active abduction: 90   Forward Elevation: 110  External Rotation 0 degrees: 30   Internal rotation 0 degrees:  L2     Tests & Signs   Cross arm: positive  Impingement: positive  Lift Off Sign: positive  Active Compression Test (Real's Sign): positive  Yergasons's Test: positive  Speed's Test: positive    Other   Sensation: normal       Muscle Strength   Right Upper Extremity   Shoulder Abduction: 5/5   Shoulder Internal Rotation: 5/5   Shoulder External Rotation: 5/5   Supraspinatus: 5/5   Subscapularis: 5/5   Biceps: 5/5   Left Upper Extremity  Shoulder Abduction: 5/5   Shoulder Internal Rotation: 4/5   Shoulder External Rotation: 4/5   Supraspinatus: 4/5   Subscapularis: 3/5   Biceps: 3/5     Vascular Exam     Right Pulses      Radial:                    2+      Left Pulses      Radial:                    2+      Capillary Refill  Right Hand: normal capillary refill  Left Hand: normal capillary refill    Intact extensor pollicis longus, flexor pollicis longus, finger flexion, finger extension, finger abduction and adduction.   Sensation intact to radial, median, ulnar, and axillary nerve distributions.   Hand warm and well perfused with capillary refill of less than 2 seconds, and palpable distal radial pulses.    Imaging:    X-Ray Shoulder 2 or More Views Left  Narrative: EXAMINATION:  XR SHOULDER COMPLETE 2 OR MORE VIEWS LEFT    CLINICAL HISTORY:  Pain in unspecified shoulder    TECHNIQUE:  Two or three views of the left shoulder were performed.    COMPARISON:  03/27/2019    FINDINGS:  No fracture or dislocation.  Joint spaces maintained with minimal AC joint degenerative findings.  Well-defined 8 mm lucency noted within the proximal left humerus.  Correlate for any prior surgery.  Lung parenchyma clear.  Impression: As above    Electronically signed by: Shahram Khoury MD  Date:    08/19/2020  Time:    10:32    Radiographic images reviewed at today's visit from previous visit, which shows minimum AC joint degeneration.    Relevant imaging results reviewed and interpreted by me, discussed with the patient and / or  family today.     Other Tests:     No other tests performed today.  This is my 2nd time see Mr. Yepez he states that prior to his last visit he has had some improved improvement with range of motion.  But he still having symptoms consisting with pain and impingement at the last visit I gave him a steroid injection which did not help at all he states that overall this is affecting his job due to pain with driving and lifting arms and lifting.  I will have him follow up for a ultrasound-guided AC joint injection due to pain in the front of the shoulder.  Follow up with Dr. Huynh after injection.  If no improvement possible MRI in the future.    Assessment:  Colby Yepez is a 55 y.o. male  8 months s/p Left shoulder exam under anesthesia, Left shoulder arthroscopy with extensive arthroscopic labral debridement, Left shoulder arthroscopic chondroplasty glenoid - inferior, Left shoulder arthroscopic subacromial decompression, Left shoulder arthroscopic assisted biceps tenodesis (12/9/2019) with Dr. Patton. He is here today for a follow up for his left shoulder.    Persistent pain and ROM limitations    Left shoulder impingement    Left shoulder biceps tendonitis   Left shoulder AC joint arthropathy    Encounter Diagnoses   Name Primary?    Shoulder impingement, left Yes    Impingement syndrome of left shoulder     Tendonitis of upper biceps tendon of left shoulder     S/P shoulder surgery     Pain         Plan:  · Continue physical therapy [facility]   · Follow up for Left shoulder AC joint injection with Dr. Huynh   · Fill out paperwork for the patient for the    · I discussed worrisome and red flag signs and symptoms with the patient. The patient expressed understanding and agreed to alert me immediately or to go to the emergency room if they experience any of these.    Treatment plan was developed with input from the patient/family, and they expressed understanding and agreement with the  plan. All questions were answered today.    Follow-up: For US guided left AC injection or sooner if there are any problems between now and then.    Disclaimer: This note was prepared using a voice recognition system and is likely to have sound alike errors within the text.    PAST SURGICAL HISTORY:  No significant past surgical history

## 2022-03-10 PROBLEM — M25.512 LEFT SHOULDER PAIN: Status: RESOLVED | Noted: 2019-12-09 | Resolved: 2022-03-10

## 2022-03-10 PROBLEM — R09.2 RESPIRATORY ARREST: Status: RESOLVED | Noted: 2017-11-10 | Resolved: 2022-03-10

## 2022-03-10 PROBLEM — K21.00 GASTROESOPHAGEAL REFLUX DISEASE WITH ESOPHAGITIS WITHOUT HEMORRHAGE: Status: ACTIVE | Noted: 2022-03-10

## 2022-03-10 PROBLEM — T78.40XA SENSITIVITY TO MEDICATION: Status: RESOLVED | Noted: 2019-11-18 | Resolved: 2022-03-10

## 2022-03-10 PROBLEM — I95.9 HYPOTENSION: Status: RESOLVED | Noted: 2019-11-18 | Resolved: 2022-03-10

## 2022-04-19 ENCOUNTER — OFFICE VISIT (OUTPATIENT)
Dept: OTOLARYNGOLOGY | Facility: CLINIC | Age: 57
End: 2022-04-19
Payer: OTHER GOVERNMENT

## 2022-04-19 ENCOUNTER — TELEPHONE (OUTPATIENT)
Dept: OTOLARYNGOLOGY | Facility: CLINIC | Age: 57
End: 2022-04-19
Payer: OTHER GOVERNMENT

## 2022-04-19 DIAGNOSIS — K21.9 LARYNGOPHARYNGEAL REFLUX (LPR): Primary | ICD-10-CM

## 2022-04-19 DIAGNOSIS — R13.10 DYSPHAGIA, UNSPECIFIED TYPE: ICD-10-CM

## 2022-04-19 DIAGNOSIS — R13.10 ODYNOPHAGIA: ICD-10-CM

## 2022-04-19 PROCEDURE — 99204 OFFICE O/P NEW MOD 45 MIN: CPT | Mod: 25,S$PBB,, | Performed by: OTOLARYNGOLOGY

## 2022-04-19 PROCEDURE — 99999 PR PBB SHADOW E&M-EST. PATIENT-LVL III: ICD-10-PCS | Mod: PBBFAC,,, | Performed by: OTOLARYNGOLOGY

## 2022-04-19 PROCEDURE — 99204 PR OFFICE/OUTPT VISIT, NEW, LEVL IV, 45-59 MIN: ICD-10-PCS | Mod: 25,S$PBB,, | Performed by: OTOLARYNGOLOGY

## 2022-04-19 PROCEDURE — 31575 DIAGNOSTIC LARYNGOSCOPY: CPT | Mod: PBBFAC,PO | Performed by: OTOLARYNGOLOGY

## 2022-04-19 PROCEDURE — 31575 PR LARYNGOSCOPY, FLEXIBLE; DIAGNOSTIC: ICD-10-PCS | Mod: S$PBB,,, | Performed by: OTOLARYNGOLOGY

## 2022-04-19 PROCEDURE — 31575 DIAGNOSTIC LARYNGOSCOPY: CPT | Mod: S$PBB,,, | Performed by: OTOLARYNGOLOGY

## 2022-04-19 PROCEDURE — 99213 OFFICE O/P EST LOW 20 MIN: CPT | Mod: PBBFAC,PO | Performed by: OTOLARYNGOLOGY

## 2022-04-19 PROCEDURE — 99999 PR PBB SHADOW E&M-EST. PATIENT-LVL III: CPT | Mod: PBBFAC,,, | Performed by: OTOLARYNGOLOGY

## 2022-04-19 RX ORDER — PANTOPRAZOLE SODIUM 40 MG/1
40 TABLET, DELAYED RELEASE ORAL 2 TIMES DAILY
Qty: 60 TABLET | Refills: 11 | Status: SHIPPED | OUTPATIENT
Start: 2022-04-19 | End: 2023-05-31

## 2022-04-19 NOTE — PROGRESS NOTES
Referring Provider:    Surefrima Self  No address on file  Subjective:   Patient: Colby Yepez [id number], :1965   Visit date:2022 12:49 PM    Chief Complaint:  Other (Left side throat, sensation of tightness, hard to swallow onset )    HPI:    Prior notes reviewed by myself.  Clinical documentation obtained by nursing staff reviewed.     58 y/o male with history of poorly controlled GERD/LPR presents with left greater than right throat pain, odynophagia, dysphagia.  He reports having issues with heartburn for at least 15 years that he is treated intermittently with over-the-counter medication.  In March, he was started on Protonix.  He has been taking the Protonix since that time and has experienced minimal benefit.  More recently he experienced an increase in his throat pain after a crawfish boil.  He denies any choking episodes or ingestion of any foreign body.        Objective:     Physical Exam:  Vitals:  There were no vitals taken for this visit.  General appearance:  Well developed, well nourished    Ears:  Otoscopy of external auditory canals and tympanic membranes was normal, clinical speech reception thresholds grossly intact, no mass/lesion of auricle.    Nose:  No masses/lesions of external nose, nasal mucosa, septum, and turbinates were within normal limits.    Mouth:  No mass/lesion of lips, teeth, gums, hard/soft palate, tongue, tonsils, or oropharynx.    Neck & Lymphatics:  No cervical lymphadenopathy, no neck mass/crepitus/ asymmetry, trachea is midline, no thyroid enlargement/tenderness/mass.  TTP over left laryngotracheal complex.        [x]  Data Reviewed:    Lab Results   Component Value Date    WBC 4.0 2021    HGB 16.6 2021    HCT 50.9 2021    MCV 88 2021    EOSINOPHIL 3 2021     Due to indication in patient's history, presentation or risk factors,  a fiber optic exam was performed.    SEPARATE PROCEDURE NOTE:    ANESTHESIA:  Topical  xylocaine with junito-synephrine  FINDINGS:  Moderate to severe inaterarytenoid erythema and edema    PROCEDURE:  After verbal consent was obtained, the flexible scope was passed through the patient's nasal cavity without difficulty.  The nasopharynx (adenoid pad) and eustachian tube orifices were first visualized and were found to be normal, without masses or irregularity.  The posterior pharyngeal wall and base of tongue were then examined and no mass or irregular tissue was seen.  The scope was then advanced to the larynx, and the epiglottis, valleculae, and piriform sinuses were normal, without masses or mucosal irregularity.  The false vocal folds and true vocal folds were then examined and were found to have normal mobility (full abduction and adduction) and no masses or mucosal irregularity was seen.  The interartyenoid area had moderate to severe edema and erythema consistent with reflux.        Assessment & Plan:   Laryngopharyngeal reflux (LPR)  -     pantoprazole (PROTONIX) 40 MG tablet; Take 1 tablet (40 mg total) by mouth 2 (two) times daily.  Dispense: 60 tablet; Refill: 11    Odynophagia  -     diphenhydrAMINE-aluminum-magnesium hydroxide-simethicone-LIDOcaine HCl 2%; Swish and swallow 10 mLs every 4 (four) hours as needed (throat pain).  Dispense: 500 mL; Refill: 0    Dysphagia, unspecified type      Signs of poorly controlled reflux on FFL  Rec increase PPI to BID and to keep GI appt in early May.

## 2022-04-19 NOTE — TELEPHONE ENCOUNTER
"Dr. Nguyen requested that I call in "Magic Mouthwash" for Mr. Yepez to his local pharmacy. I contacted the pharmacy, spoke to the pharmacist and was told that the order is put in.  "

## 2022-05-06 ENCOUNTER — OFFICE VISIT (OUTPATIENT)
Dept: GASTROENTEROLOGY | Facility: CLINIC | Age: 57
End: 2022-05-06
Payer: OTHER GOVERNMENT

## 2022-05-06 VITALS
SYSTOLIC BLOOD PRESSURE: 102 MMHG | WEIGHT: 237 LBS | HEIGHT: 74 IN | HEART RATE: 70 BPM | BODY MASS INDEX: 30.42 KG/M2 | DIASTOLIC BLOOD PRESSURE: 64 MMHG

## 2022-05-06 DIAGNOSIS — J02.9 SORE THROAT: Primary | ICD-10-CM

## 2022-05-06 DIAGNOSIS — R13.10 PAINFUL SWALLOWING: ICD-10-CM

## 2022-05-06 DIAGNOSIS — K21.00 GASTROESOPHAGEAL REFLUX DISEASE WITH ESOPHAGITIS WITHOUT HEMORRHAGE: ICD-10-CM

## 2022-05-06 PROCEDURE — 99204 PR OFFICE/OUTPT VISIT, NEW, LEVL IV, 45-59 MIN: ICD-10-PCS | Mod: S$PBB,,, | Performed by: NURSE PRACTITIONER

## 2022-05-06 PROCEDURE — 99999 PR PBB SHADOW E&M-EST. PATIENT-LVL IV: CPT | Mod: PBBFAC,,, | Performed by: NURSE PRACTITIONER

## 2022-05-06 PROCEDURE — 99214 OFFICE O/P EST MOD 30 MIN: CPT | Mod: PBBFAC | Performed by: NURSE PRACTITIONER

## 2022-05-06 PROCEDURE — 99204 OFFICE O/P NEW MOD 45 MIN: CPT | Mod: S$PBB,,, | Performed by: NURSE PRACTITIONER

## 2022-05-06 PROCEDURE — 99999 PR PBB SHADOW E&M-EST. PATIENT-LVL IV: ICD-10-PCS | Mod: PBBFAC,,, | Performed by: NURSE PRACTITIONER

## 2022-05-06 NOTE — PROGRESS NOTES
"Clinic Consult:  [facility] Consultation Note    Reason for Consult:  The primary encounter diagnosis was Sore throat. Diagnoses of Painful swallowing and Gastroesophageal reflux disease with esophagitis without hemorrhage were also pertinent to this visit.    PCP: Cuong Riojas   [address] / [facility] [address]    HPI:  This is a 57 y.o. male here for evaluation of the above.   He reports sore throat. Pain is constant but is worse when swallowing. Drinking whey protein and fairlife milk cause symptoms of his throat below rosenberg apple feeling like it is "locking up".   He has a history of bleeding ulcer.   Treatments have tried: pantoprazole 40 mg daily for the last couple of weeks and has not improved. He does use magic mouthwash which does help. He also has decreased coffee, does take ASA 81 mg daily but no other NSAIDs, no spicy or fatty foods.     Review of Systems   Constitutional: Negative for fever, malaise/fatigue and weight loss.   HENT: Positive for sore throat.    Respiratory: Negative for cough and wheezing.    Cardiovascular: Negative for chest pain and palpitations.   Gastrointestinal: Positive for heartburn. Negative for abdominal pain, blood in stool, constipation, diarrhea, melena, nausea and vomiting.   Genitourinary: Negative for dysuria and frequency.   Musculoskeletal: Negative for back pain, joint pain, myalgias and neck pain.   Skin: Negative for itching and rash.   Neurological: Negative for dizziness, speech change, seizures, loss of consciousness and headaches.   Psychiatric/Behavioral: Negative for depression and substance abuse. The patient is not nervous/anxious.        Medical History:  has a past medical history of Allergy, Hypotension (11/18/2019), Hypotension, iatrogenic, Kidney stone, Left shoulder pain (12/9/2019), Liver cyst, Obesity, Respiratory arrest (11/10/2017), Sensitivity to medication (11/18/2019), and Thyroid disease.    Surgical History:  " has a past surgical history that includes Sinus surgery (2007); TONSILLECTOMY, ADENOIDECTOMY, BILATERAL yringotomy and tubes; Vasectomy (10/27/2017); Colonoscopy (N/A, 5/13/2019); Injection of joint (Left, 7/25/2019); Ganglion cyst excision (Left); Arthroscopy of shoulder with decompression of subacromial space (Left, 12/9/2019); Examination under anesthesia (Left, 12/9/2019); Biceps tendon repair (Left, 12/9/2019); Arthroscopic debridement of shoulder (Left, 12/9/2019); Chondroplasty of shoulder (Left, 12/9/2019); Injection of joint (Left, 12/28/2020); and Transforaminal epidural injection of steroid (Right, 3/4/2021).    Family History: family history includes Hyperlipidemia in his father; Hypertension in his mother; Prostate cancer in his father..     Social History:  reports that he has never smoked. He has never used smokeless tobacco. He reports current alcohol use. He reports that he does not use drugs.    Allergies: Reviewed    Home Medications:   Current Outpatient Medications on File Prior to Visit   Medication Sig Dispense Refill    aspirin (ECOTRIN) 81 MG EC tablet Take 1 tablet (81 mg total) by mouth once daily. With food 30 tablet 0    multivitamin (THERAGRAN) per tablet Take 1 tablet by mouth once daily. Hold 5 days prior to surgery      pantoprazole (PROTONIX) 40 MG tablet Take 1 tablet (40 mg total) by mouth 2 (two) times daily. 60 tablet 11    diphenhydrAMINE-aluminum-magnesium hydroxide-simethicone-LIDOcaine HCl 2% Swish and swallow 10 mLs every 4 (four) hours as needed (throat pain). (Patient not taking: Reported on 5/6/2022) 500 mL 0    ibuprofen (ADVIL,MOTRIN) 800 MG tablet TAKE 1 TABLET(800 MG) BY MOUTH TWICE DAILY AS NEEDED FOR PAIN (Patient not taking: No sig reported) 60 tablet 0    metronidazole 0.75% (METROCREAM) 0.75 % Crea ASTON EXT AA BID      sildenafiL (VIAGRA) 100 MG tablet TAKE 1 TABLET BY MOUTH ONCE DAILY AS NEEDED (Patient not taking: Reported on 5/6/2022) 30 tablet 0  "    Current Facility-Administered Medications on File Prior to Visit   Medication Dose Route Frequency Provider Last Rate Last Admin    [DISCONTINUED] lidocaine (PF) 10 mg/ml (1%) injection 5 mg  0.5 mL Subcutaneous Once Luis Miguel Paz MD        [DISCONTINUED] ondansetron injection 4 mg  4 mg Intravenous Once PRN Tanmay Weathers MD           Physical Exam:  /64 (BP Location: Left arm, Patient Position: Sitting, BP Method: Medium (Manual))   Pulse 70   Ht 6' 2" (1.88 m)   Wt 107.5 kg (236 lb 15.9 oz)   BMI 30.43 kg/m²   Body mass index is 30.43 kg/m².  Physical Exam  Constitutional:       General: He is not in acute distress.  HENT:      Head: Normocephalic and atraumatic.   Eyes:      General: No scleral icterus.     Conjunctiva/sclera: Conjunctivae normal.   Cardiovascular:      Rate and Rhythm: Normal rate and regular rhythm.      Heart sounds: No murmur heard.  Pulmonary:      Effort: Pulmonary effort is normal. No respiratory distress.      Breath sounds: Normal breath sounds. No wheezing.   Abdominal:      General: Abdomen is flat. Bowel sounds are normal.      Palpations: Abdomen is soft.      Tenderness: There is no abdominal tenderness.   Skin:     General: Skin is warm and dry.   Neurological:      General: No focal deficit present.      Mental Status: He is alert and oriented to person, place, and time.      Cranial Nerves: No cranial nerve deficit.   Psychiatric:         Mood and Affect: Mood normal.         Judgment: Judgment normal.         Labs: Pertinent labs reviewed.  CRC Screening: up to date     Assessment:  1. Sore throat    2. Painful swallowing    3. Gastroesophageal reflux disease with esophagitis without hemorrhage        Recommendations:   - EGD for further evaluation. Will also get BRAVO off PPI  - can continue PPI until 7 days before scope.     Sore throat  -     Case Request Endoscopy: EGD (ESOPHAGOGASTRODUODENOSCOPY), PH MONITORING, ESOPHAGUS, WIRELESS, (OFF REFLUX " MEDS)    Painful swallowing  -     Case Request Endoscopy: EGD (ESOPHAGOGASTRODUODENOSCOPY), PH MONITORING, ESOPHAGUS, WIRELESS, (OFF REFLUX MEDS)    Gastroesophageal reflux disease with esophagitis without hemorrhage  -     Ambulatory referral/consult to Gastroenterology  -     Case Request Endoscopy: EGD (ESOPHAGOGASTRODUODENOSCOPY), PH MONITORING, ESOPHAGUS, WIRELESS, (OFF REFLUX MEDS)      Follow up to be determined after results/ procedure(s).    Thank you so much for allowing me to participate in the care of ANNALISA Godfrey

## 2022-05-12 NOTE — PRE-PROCEDURE INSTRUCTIONS
PAT call completed and patient educated on procedure instructions. Medical history discussed and patient informed of arrival time 1200PM on Wednesday, MAY 18, at [facility].     Pt will be accompanied by wife and is  made aware of limited-visitor policy, and that  is to remain during entire visit.    All questions and concerns addressed.  Endoscopy instructions reviewed.     Nothing to eat or drink after 12midnight. Patient verbalizes understanding of teaching and all instructions.   Pre-procedure Covid testing on Gopal, May 15 at [facility] between 8a-11am. Patient aware.

## 2022-05-13 ENCOUNTER — ANESTHESIA EVENT (OUTPATIENT)
Dept: ENDOSCOPY | Facility: HOSPITAL | Age: 57
End: 2022-05-13
Payer: OTHER GOVERNMENT

## 2022-05-13 NOTE — ANESTHESIA PREPROCEDURE EVALUATION
05/13/2022  Colby Yepez is a 57 y.o., male.  Past Medical History:   Diagnosis Date    Allergy     Hypotension 11/18/2019    Hypotension, iatrogenic     Kidney stone     Left shoulder pain 12/09/2019    Liver cyst     Obesity     Respiratory arrest 11/10/2017    Valium and percocet caused this.    Sensitivity to medication 11/18/2019    Thyroid disease      Past Surgical History:   Procedure Laterality Date    ARTHROSCOPIC DEBRIDEMENT OF SHOULDER Left 12/9/2019    Procedure: DEBRIDEMENT, SHOULDER, ARTHROSCOPIC;  Surgeon: Lenny Patton MD;  Location: [facility] OR;  Service: Orthopedics;  Laterality: Left;    ARTHROSCOPY OF SHOULDER WITH DECOMPRESSION OF SUBACROMIAL SPACE Left 12/9/2019    Procedure: ARTHROSCOPY, SHOULDER, WITH SUBACROMIAL SPACE DECOMPRESSION;  Surgeon: Lenny Patton MD;  Location: [facility] OR;  Service: Orthopedics;  Laterality: Left;    BICEPS TENDON REPAIR Left 12/9/2019    Procedure: REPAIR, TENDON, BICEPS;  Surgeon: Lenny Patton MD;  Location: [facility] OR;  Service: Orthopedics;  Laterality: Left;  arthroscopic bicep tenodesis    CHONDROPLASTY OF SHOULDER Left 12/9/2019    Procedure: CHONDROPLASTY, SHOULDER;  Surgeon: Lenny Patton MD;  Location: [facility] OR;  Service: Orthopedics;  Laterality: Left;  arthroscopic    COLONOSCOPY N/A 5/13/2019    Procedure: COLONOSCOPY;  Surgeon: Soha Norton MD;  Location: [facility] ENDO;  Service: Endoscopy;  Laterality: N/A;    EXAMINATION UNDER ANESTHESIA Left 12/9/2019    Procedure: EXAM UNDER ANESTHESIA;  Surgeon: Lenny Patton MD;  Location: [facility] OR;  Service: Orthopedics;  Laterality: Left;    GANGLION CYST EXCISION Left     INJECTION OF JOINT Left 7/25/2019    Procedure: Left shoulder Joint injection with local;  Surgeon: Leonardo Collado MD;  Location: [facility] PAIN MGT;  Service: Pain Management;  Laterality: Left;     INJECTION OF JOINT Left 12/28/2020    Procedure: Left shoulder Joint injection;  Surgeon: Tanmay Weathers MD;  Location: [facility] PAIN MGT;  Service: Pain Management;  Laterality: Left;    SINUS SURGERY  2007    TONSILLECTOMY, ADENOIDECTOMY, BILATERAL MYRINGOTOMY AND TUBES      TRANSFORAMINAL EPIDURAL INJECTION OF STEROID Right 3/4/2021    Procedure: Right L5/S1 +/- L4/5 TF NOEMY;  Surgeon: Tanmay Weathers MD;  Location: [facility] PAIN MGT;  Service: Pain Management;  Laterality: Right;    VASECTOMY  10/27/2017         Pre-op Assessment    I have reviewed the Patient Summary Reports.     I have reviewed the Nursing Notes. I have reviewed the NPO Status.   I have reviewed the Medications.     Review of Systems  Anesthesia Hx:  No problems with previous Anesthesia Grade 1 view with Smith 2. History of prior surgery of interest to airway management or planning: Previous anesthesia: General Airway issues documented on chart review include mask, easy, easy direct laryngoscopy    Social:  Non-Smoker    Cardiovascular:   ECG has been reviewed. Normal sinus rhythm   Normal ECG   When compared with ECG of 11-AUG-2010 16:27,   Vent. rate has increased BY  24 BPM   Confirmed by BREEZY MACHUCA VEENA ([id number]) on 11/18/2019 8:44:22 PM    Renal/:   Chronic Renal Disease    Hepatic/GI:   Liver Disease,    Musculoskeletal:   Arthritis     Neurological:   Neuromuscular Disease,        Physical Exam  General: Alert and Oriented    Airway:  Mallampati: II   Mouth Opening: Normal  TM Distance: Normal  Tongue: Normal  Neck ROM: Normal ROM    Chest/Lungs:  Clear to auscultation, Normal Respiratory Rate    Heart:  Rate: Normal  Rhythm: Regular Rhythm        Anesthesia Plan  Type of Anesthesia, risks & benefits discussed:    Anesthesia Type: Gen Natural Airway  Intra-op Monitoring Plan: Standard ASA Monitors  Induction:  IV  Informed Consent: Informed consent signed with the Patient and all parties understand the risks and agree with  anesthesia plan.  All questions answered.   ASA Score: 2  Day of Surgery Review of History & Physical: H&P Update referred to the surgeon/provider.    Ready For Surgery From Anesthesia Perspective.     .

## 2022-05-15 ENCOUNTER — LAB VISIT (OUTPATIENT)
Dept: PRIMARY CARE CLINIC | Facility: CLINIC | Age: 57
End: 2022-05-15
Payer: OTHER GOVERNMENT

## 2022-05-15 DIAGNOSIS — Z20.822 ENCOUNTER FOR LABORATORY TESTING FOR COVID-19 VIRUS: Primary | ICD-10-CM

## 2022-05-15 PROCEDURE — U0005 INFEC AGEN DETEC AMPLI PROBE: HCPCS | Performed by: INTERNAL MEDICINE

## 2022-05-15 PROCEDURE — U0003 INFECTIOUS AGENT DETECTION BY NUCLEIC ACID (DNA OR RNA); SEVERE ACUTE RESPIRATORY SYNDROME CORONAVIRUS 2 (SARS-COV-2) (CORONAVIRUS DISEASE [COVID-19]), AMPLIFIED PROBE TECHNIQUE, MAKING USE OF HIGH THROUGHPUT TECHNOLOGIES AS DESCRIBED BY CMS-2020-01-R: HCPCS | Performed by: INTERNAL MEDICINE

## 2022-05-16 LAB — SARS-COV-2 RNA RESP QL NAA+PROBE: NOT DETECTED

## 2022-05-18 ENCOUNTER — HOSPITAL ENCOUNTER (OUTPATIENT)
Facility: HOSPITAL | Age: 57
Discharge: HOME | End: 2022-05-18
Attending: INTERNAL MEDICINE | Admitting: INTERNAL MEDICINE
Payer: OTHER GOVERNMENT

## 2022-05-18 ENCOUNTER — ANESTHESIA (OUTPATIENT)
Dept: ENDOSCOPY | Facility: HOSPITAL | Age: 57
End: 2022-05-18
Payer: OTHER GOVERNMENT

## 2022-05-18 VITALS
SYSTOLIC BLOOD PRESSURE: 110 MMHG | RESPIRATION RATE: 18 BRPM | HEART RATE: 68 BPM | TEMPERATURE: 97 F | WEIGHT: 236.13 LBS | BODY MASS INDEX: 30.31 KG/M2 | OXYGEN SATURATION: 99 % | HEIGHT: 74 IN | DIASTOLIC BLOOD PRESSURE: 77 MMHG

## 2022-05-18 DIAGNOSIS — K21.00 GASTROESOPHAGEAL REFLUX DISEASE WITH ESOPHAGITIS WITHOUT HEMORRHAGE: Primary | ICD-10-CM

## 2022-05-18 PROCEDURE — 88305 TISSUE EXAM BY PATHOLOGIST: ICD-10-PCS | Mod: 26,,, | Performed by: PATHOLOGY

## 2022-05-18 PROCEDURE — 43239 EGD BIOPSY SINGLE/MULTIPLE: CPT | Mod: ,,, | Performed by: INTERNAL MEDICINE

## 2022-05-18 PROCEDURE — 88305 TISSUE EXAM BY PATHOLOGIST: CPT | Performed by: PATHOLOGY

## 2022-05-18 PROCEDURE — D9220A PRA ANESTHESIA: ICD-10-PCS | Mod: ANES,,, | Performed by: ANESTHESIOLOGY

## 2022-05-18 PROCEDURE — 63600175 PHARM REV CODE 636 W HCPCS: Performed by: NURSE ANESTHETIST, CERTIFIED REGISTERED

## 2022-05-18 PROCEDURE — 00731 ANES UPR GI NDSC PX NOS: CPT | Performed by: INTERNAL MEDICINE

## 2022-05-18 PROCEDURE — D9220A PRA ANESTHESIA: ICD-10-PCS | Mod: CRNA,,, | Performed by: NURSE ANESTHETIST, CERTIFIED REGISTERED

## 2022-05-18 PROCEDURE — 27201012 HC FORCEPS, HOT/COLD, DISP: Performed by: INTERNAL MEDICINE

## 2022-05-18 PROCEDURE — D9220A PRA ANESTHESIA: Mod: CRNA,,, | Performed by: NURSE ANESTHETIST, CERTIFIED REGISTERED

## 2022-05-18 PROCEDURE — 43239 PR EGD, FLEX, W/BIOPSY, SGL/MULTI: ICD-10-PCS | Mod: ,,, | Performed by: INTERNAL MEDICINE

## 2022-05-18 PROCEDURE — 43239 EGD BIOPSY SINGLE/MULTIPLE: CPT | Performed by: INTERNAL MEDICINE

## 2022-05-18 PROCEDURE — 37000008 HC ANESTHESIA 1ST 15 MINUTES: Performed by: INTERNAL MEDICINE

## 2022-05-18 PROCEDURE — 88305 TISSUE EXAM BY PATHOLOGIST: CPT | Mod: 26,,, | Performed by: PATHOLOGY

## 2022-05-18 PROCEDURE — 37000009 HC ANESTHESIA EA ADD 15 MINS: Performed by: INTERNAL MEDICINE

## 2022-05-18 PROCEDURE — 63600175 PHARM REV CODE 636 W HCPCS: Performed by: INTERNAL MEDICINE

## 2022-05-18 PROCEDURE — D9220A PRA ANESTHESIA: Mod: ANES,,, | Performed by: ANESTHESIOLOGY

## 2022-05-18 RX ORDER — SODIUM CHLORIDE, SODIUM LACTATE, POTASSIUM CHLORIDE, CALCIUM CHLORIDE 600; 310; 30; 20 MG/100ML; MG/100ML; MG/100ML; MG/100ML
INJECTION, SOLUTION INTRAVENOUS CONTINUOUS
Status: ACTIVE | OUTPATIENT
Start: 2022-05-18

## 2022-05-18 RX ORDER — LIDOCAINE HCL/PF 100 MG/5ML
SYRINGE (ML) INTRAVENOUS
Status: DISCONTINUED | OUTPATIENT
Start: 2022-05-18 | End: 2022-05-18

## 2022-05-18 RX ORDER — PROPOFOL 10 MG/ML
INJECTION, EMULSION INTRAVENOUS
Status: DISCONTINUED | OUTPATIENT
Start: 2022-05-18 | End: 2022-05-18

## 2022-05-18 RX ADMIN — Medication 50 MG: at 10:05

## 2022-05-18 RX ADMIN — PROPOFOL 20 MG: 10 INJECTION, EMULSION INTRAVENOUS at 10:05

## 2022-05-18 RX ADMIN — SODIUM CHLORIDE, SODIUM LACTATE, POTASSIUM CHLORIDE, AND CALCIUM CHLORIDE: 600; 310; 30; 20 INJECTION, SOLUTION INTRAVENOUS at 10:05

## 2022-05-18 RX ADMIN — PROPOFOL 160 MG: 10 INJECTION, EMULSION INTRAVENOUS at 10:05

## 2022-05-18 NOTE — PLAN OF CARE
Discharge instructions reviewed with patient and visitor. Handouts given & verbalized understanding with no further questions at this time. Dr Norton spoke to pt at bedside, reviewed procedure and findings, answered questions. Made aware they are awaiting biopsy results with MD telephone number provided per AVS sheet. VSS on RA, no pain or nausea noted, tolerating po fluids, no complaints noted. Fall precautions reviewed, consents in chart, PIV to be removed at discharge.

## 2022-05-18 NOTE — TRANSFER OF CARE
"Anesthesia Transfer of Care Note    Patient: Colby Yepez    Procedure(s) Performed: Procedure(s) (LRB):  EGD (ESOPHAGOGASTRODUODENOSCOPY) (N/A)  PH MONITORING, ESOPHAGUS, WIRELESS, (OFF REFLUX MEDS) (N/A)    Patient location: PACU    Anesthesia Type: general    Transport from OR: Transported from OR on room air with adequate spontaneous ventilation    Post pain: adequate analgesia    Post assessment: no apparent anesthetic complications and tolerated procedure well    Post vital signs: stable    Level of consciousness: awake    Nausea/Vomiting: no nausea/vomiting    Complications: none    Transfer of care protocol was followed      Last vitals:   Visit Vitals  /64 (BP Location: Left arm, Patient Position: Lying)   Pulse 66   Temp 36.3 °C (97.3 °F) (Temporal)   Resp 16   Ht 6' 2" (1.88 m)   Wt 107.1 kg (236 lb 1.8 oz)   SpO2 97%   BMI 30.32 kg/m²     "

## 2022-05-18 NOTE — H&P
PRE PROCEDURE H&P    Patient Name: Colby Yepez  MRN: [id number]  : 1965  Date of Procedure:  2022  Referring Physician: Allie Saenz NP  Primary Physician: Cuong Riojas MD  Procedure Physician: Soha Norton MD       Planned Procedure: EGD with bravo  Diagnosis: gerd  Chief Complaint: Same as above    HPI: Patient is an 57 y.o. male is here for the above.         Past Medical History:   Past Medical History:   Diagnosis Date    Allergy     Hypotension 2019    Hypotension, iatrogenic     Kidney stone     Left shoulder pain 2019    Liver cyst     Obesity     Respiratory arrest 11/10/2017    Valium and percocet caused this.    Sensitivity to medication 2019    Thyroid disease         Past Surgical History:  Past Surgical History:   Procedure Laterality Date    ARTHROSCOPIC DEBRIDEMENT OF SHOULDER Left 2019    Procedure: DEBRIDEMENT, SHOULDER, ARTHROSCOPIC;  Surgeon: Lenny Patton MD;  Location: [facility];  Service: Orthopedics;  Laterality: Left;    ARTHROSCOPY OF SHOULDER WITH DECOMPRESSION OF SUBACROMIAL SPACE Left 2019    Procedure: ARTHROSCOPY, SHOULDER, WITH SUBACROMIAL SPACE DECOMPRESSION;  Surgeon: Lenny Patton MD;  Location: [facility] OR;  Service: Orthopedics;  Laterality: Left;    BICEPS TENDON REPAIR Left 2019    Procedure: REPAIR, TENDON, BICEPS;  Surgeon: Lenny Patton MD;  Location: [facility] OR;  Service: Orthopedics;  Laterality: Left;  arthroscopic bicep tenodesis    CHONDROPLASTY OF SHOULDER Left 2019    Procedure: CHONDROPLASTY, SHOULDER;  Surgeon: Lenny Patton MD;  Location: [facility] OR;  Service: Orthopedics;  Laterality: Left;  arthroscopic    COLONOSCOPY N/A 2019    Procedure: COLONOSCOPY;  Surgeon: Soha Norton MD;  Location: [facility];  Service: Endoscopy;  Laterality: N/A;    EXAMINATION UNDER ANESTHESIA Left 2019    Procedure: EXAM UNDER ANESTHESIA;  Surgeon: Lenny Patton MD;   Location: [facility] OR;  Service: Orthopedics;  Laterality: Left;    GANGLION CYST EXCISION Left     INJECTION OF JOINT Left 7/25/2019    Procedure: Left shoulder Joint injection with local;  Surgeon: Leonardo Collado MD;  Location: [facility] PAIN MGT;  Service: Pain Management;  Laterality: Left;    INJECTION OF JOINT Left 12/28/2020    Procedure: Left shoulder Joint injection;  Surgeon: Tanmay Weathers MD;  Location: [facility] PAIN MGT;  Service: Pain Management;  Laterality: Left;    SINUS SURGERY  2007    TONSILLECTOMY, ADENOIDECTOMY, BILATERAL MYRINGOTOMY AND TUBES      TRANSFORAMINAL EPIDURAL INJECTION OF STEROID Right 3/4/2021    Procedure: Right L5/S1 +/- L4/5 TF NOEMY;  Surgeon: Tanmay Weathers MD;  Location: [facility] PAIN MGT;  Service: Pain Management;  Laterality: Right;    VASECTOMY  10/27/2017        Home Medications:  Prior to Admission medications    Medication Sig Start Date End Date Taking? Authorizing Provider   pantoprazole (PROTONIX) 40 MG tablet Take 1 tablet (40 mg total) by mouth 2 (two) times daily. 4/19/22 4/19/23 Yes Bruno Nguyen MD   aspirin (ECOTRIN) 81 MG EC tablet Take 1 tablet (81 mg total) by mouth once daily. With food 12/9/19 5/6/22  Lenny Patton MD   diphenhydrAMINE-aluminum-magnesium hydroxide-simethicone-LIDOcaine HCl 2% Swish and swallow 10 mLs every 4 (four) hours as needed (throat pain).  Patient not taking: Reported on 5/6/2022 4/19/22   Bruno Nguyen MD   ibuprofen (ADVIL,MOTRIN) 800 MG tablet TAKE 1 TABLET(800 MG) BY MOUTH TWICE DAILY AS NEEDED FOR PAIN  Patient not taking: No sig reported 7/22/21   Thao Carney NP   metronidazole 0.75% (METROCREAM) 0.75 % Crea ASTON EXT AA BID 10/27/20   Historical Provider   multivitamin (THERAGRAN) per tablet Take 1 tablet by mouth once daily. Hold 5 days prior to surgery    Historical Provider   sildenafiL (VIAGRA) 100 MG tablet TAKE 1 TABLET BY MOUTH ONCE DAILY AS NEEDED  Patient not taking: Reported on 5/6/2022 3/16/22   Cuong Riojas,  "MD        Allergies:  Review of patient's allergies indicates:   Allergen Reactions    Tramadol Other (See Comments)     dizziness        Social History:   Social History     Socioeconomic History    Marital status:     Number of children: 3   Occupational History     Employer: [employer]   Tobacco Use    Smoking status: Never Smoker    Smokeless tobacco: Never Used   Substance and Sexual Activity    Alcohol use: Yes     Comment: monthly    Drug use: No       Family History:  Family History   Problem Relation Age of Onset    Hypertension Mother     Prostate cancer Father     Hyperlipidemia Father        ROS: No acute cardiac events, no acute respiratory complaints.     Physical Exam (all patients):    /82 (BP Location: Right arm, Patient Position: Sitting)   Pulse 65   Temp 98.1 °F (36.7 °C) (Temporal)   Resp 16   Ht 6' 2" (1.88 m)   Wt 107.1 kg (236 lb 1.8 oz)   SpO2 100%   BMI 30.32 kg/m²   Lungs: Clear to auscultation bilaterally, respirations unlabored  Heart: Regular rate and rhythm, S1 and S2 normal, no obvious murmurs  Abdomen:         Soft, non-tender, bowel sounds normal, no masses, no organomegaly    Lab Results   Component Value Date    WBC 4.0 11/30/2021    MCV 88 11/30/2021    RDW 13.3 11/30/2021     (L) 11/30/2021    INR 1.2 08/11/2010    GLU 84 11/30/2021    BUN 15 11/30/2021     11/30/2021    K 4.4 11/30/2021     11/30/2021        SEDATION PLAN: per anesthesia      History reviewed, vital signs satisfactory, cardiopulmonary status satisfactory, sedation options, risks and plans have been discussed with the patient  All their questions were answered and the patient agrees to the sedation procedures as planned and the patient is deemed an appropriate candidate for the sedation as planned.    Procedure explained to patient, informed consent obtained and placed in chart.    Soha Norton  5/18/2022  10:22 AM   "

## 2022-05-18 NOTE — PROVATION PATIENT INSTRUCTIONS
Discharge Summary/Instructions after an Endoscopic Procedure  Patient Name: Colby Yepez  Patient MRN: [id number]  Patient YOB: 1965  Wednesday, May 18, 2022  Soha Norton MD  Dear patient,  As a result of recent federal legislation (The Federal Cures Act), you may   receive lab or pathology results from your procedure in your [facility]   account before your physician is able to contact you. Your physician or   their representative will relay the results to you with their   recommendations at their soonest availability.  Thank you,  RESTRICTIONS:  During your procedure today, you received medications for sedation.  These   medications may affect your judgment, balance and coordination.  Therefore,   for 24 hours, you have the following restrictions:   - DO NOT drive a car, operate machinery, make legal/financial decisions,   sign important papers or drink alcohol.    ACTIVITY:  Today: no heavy lifting, straining or running due to procedural   sedation/anesthesia.  The following day: return to full activity including work.  DIET:  Eat and drink normally unless instructed otherwise.     TREATMENT FOR COMMON SIDE EFFECTS:  - Mild abdominal pain, nausea, belching, bloating or excessive gas:  rest,   eat lightly and use a heating pad.  - Sore Throat: treat with throat lozenges and/or gargle with warm salt   water.  - Because air was used during the procedure, expelling large amounts of air   from your rectum or belching is normal.  - If a bowel prep was taken, you may not have a bowel movement for 1-3 days.    This is normal.  SYMPTOMS TO WATCH FOR AND REPORT TO YOUR PHYSICIAN:  1. Abdominal pain or bloating, other than gas cramps.  2. Chest pain.  3. Back pain.  4. Signs of infection such as: chills or fever occurring within 24 hours   after the procedure.  5. Rectal bleeding, which would show as bright red, maroon, or black stools.   (A tablespoon of blood from the rectum is not serious, especially  if   hemorrhoids are present.)  6. Vomiting.  7. Weakness or dizziness.  GO DIRECTLY TO THE NEAREST EMERGENCY ROOM IF YOU HAVE ANY OF THE FOLLOWING:      Difficulty breathing              Chills and/or fever over 101 F   Persistent vomiting and/or vomiting blood   Severe abdominal pain   Severe chest pain   Black, tarry stools   Bleeding- more than one tablespoon   Any other symptom or condition that you feel may need urgent attention  Your doctor recommends these additional instructions:  If any biopsies were taken, your doctors clinic will contact you in 1 to 2   weeks with any results.  - Patient has a contact number available for emergencies.  The signs and   symptoms of potential delayed complications were discussed with the   patient.  Return to normal activities tomorrow.  Written discharge   instructions were provided to the patient.   - Discharge patient to home (via wheelchair).   - Resume previous diet today.   - Continue present medications.   - Use Protonix (pantoprazole) 40 mg PO daily.   - Return to GI clinic.  For questions, problems or results please call your physician Soha Norton MD at Work:  [phone]  If you have any questions about the above instructions, call the GI   department at [phone] or call the endoscopy unit at [phone]   from 7am until 3 pm.  [facility] [place], EMERGENCY ROOM PHONE NUMBER:   [phone]  IF A COMPLICATION OR EMERGENCY SITUATION ARISES AND YOU ARE UNABLE TO REACH   YOUR PHYSICIAN - GO DIRECTLY TO THE EMERGENCY ROOM.  I have read or have had read to me these discharge instructions for my   procedure and have received a written copy.  I understand these   instructions and will follow-up with my physician if I have any questions.     __________________________________       _____________________________________  Nurse Signature                                          Patient/Designated   Responsible Party Signature  Soha  MD Soha Norton MD  5/18/2022 10:53:36 AM  This report has been verified and signed electronically.  Dear patient,  As a result of recent federal legislation (The Federal Cures Act), you may   receive lab or pathology results from your procedure in your [facility]   account before your physician is able to contact you. Your physician or   their representative will relay the results to you with their   recommendations at their soonest availability.  Thank you,  [employer]ATION

## 2022-05-18 NOTE — ANESTHESIA POSTPROCEDURE EVALUATION
Anesthesia Post Evaluation    Patient: Cobly Yepez    Procedure(s) Performed: Procedure(s) (LRB):  EGD (ESOPHAGOGASTRODUODENOSCOPY) (N/A)  PH MONITORING, ESOPHAGUS, WIRELESS, (OFF REFLUX MEDS) (N/A)    Final Anesthesia Type: general      Patient location during evaluation: PACU  Patient participation: Yes- Able to Participate  Level of consciousness: awake and alert and oriented  Post-procedure vital signs: reviewed and stable  Pain management: adequate  Airway patency: patent    PONV status at discharge: No PONV  Anesthetic complications: no      Cardiovascular status: blood pressure returned to baseline, stable and hemodynamically stable  Respiratory status: unassisted  Hydration status: euvolemic  Follow-up not needed.          Vitals Value Taken Time   /77 05/18/22 1125   Temp 36.3 °C (97.3 °F) 05/18/22 1055   Pulse 68 05/18/22 1125   Resp 18 05/18/22 1125   SpO2 99 % 05/18/22 1125         Event Time   Out of Recovery 11:31:55         Pain/Kevin Score: Kevin Score: 10 (5/18/2022 11:25 AM)

## 2022-05-24 LAB
FINAL PATHOLOGIC DIAGNOSIS: NORMAL
GROSS: NORMAL
Lab: NORMAL

## 2022-05-27 ENCOUNTER — PATIENT MESSAGE (OUTPATIENT)
Dept: GASTROENTEROLOGY | Facility: CLINIC | Age: 57
End: 2022-05-27
Payer: OTHER GOVERNMENT

## 2022-05-30 ENCOUNTER — TELEPHONE (OUTPATIENT)
Dept: GASTROENTEROLOGY | Facility: CLINIC | Age: 57
End: 2022-05-30
Payer: OTHER GOVERNMENT

## 2022-05-30 NOTE — TELEPHONE ENCOUNTER
----- Message from Soha Norton MD sent at 5/26/2022 11:12 AM CDT -----  Increased eos in distal esophagus.  Maximize ppi.  If after maximizing ppi and pt has complaint of dysphagia, then can consider repeat EGD to determine if EoE.

## 2023-02-27 ENCOUNTER — OFFICE VISIT (OUTPATIENT)
Dept: SURGERY | Facility: CLINIC | Age: 58
End: 2023-02-27
Payer: OTHER GOVERNMENT

## 2023-02-27 VITALS
BODY MASS INDEX: 31.32 KG/M2 | DIASTOLIC BLOOD PRESSURE: 71 MMHG | SYSTOLIC BLOOD PRESSURE: 122 MMHG | HEIGHT: 74 IN | TEMPERATURE: 98 F | HEART RATE: 71 BPM | WEIGHT: 244 LBS

## 2023-02-27 DIAGNOSIS — K40.90 NON-RECURRENT UNILATERAL INGUINAL HERNIA WITHOUT OBSTRUCTION OR GANGRENE: ICD-10-CM

## 2023-02-27 PROCEDURE — 99215 OFFICE O/P EST HI 40 MIN: CPT | Mod: PBBFAC | Performed by: SURGERY

## 2023-02-27 PROCEDURE — 99999 PR PBB SHADOW E&M-EST. PATIENT-LVL V: CPT | Mod: PBBFAC,,, | Performed by: SURGERY

## 2023-02-27 PROCEDURE — 99204 OFFICE O/P NEW MOD 45 MIN: CPT | Mod: S$PBB,,, | Performed by: SURGERY

## 2023-02-27 PROCEDURE — 99999 PR PBB SHADOW E&M-EST. PATIENT-LVL V: ICD-10-PCS | Mod: PBBFAC,,, | Performed by: SURGERY

## 2023-02-27 PROCEDURE — 99204 PR OFFICE/OUTPT VISIT, NEW, LEVL IV, 45-59 MIN: ICD-10-PCS | Mod: S$PBB,,, | Performed by: SURGERY

## 2023-02-27 NOTE — PROGRESS NOTES
Patient ID: Colby Yepez is a 58 y.o. male.\    Right inguinal hernia    Chief Complaint: Consult and Hernia (Inguinal hernia)      HPI:  58-year-old male who noticed a bulge in his right groin associated with some pain.  That episode occurred several weeks ago.  He no longer has pain.  He is push the bulge in on 1 or 2 occasions.  He is seen by his primary care doctor found to have a right inguinal hernia.  Presents now to discuss repair.      He does get some dizziness with oral tramadol.  He says he has a relatively low blood pressure and the narcotics team to make things a little worse    Review of Systems   Constitutional: Negative.    HENT: Negative.     Eyes: Negative.    Respiratory: Negative.     Cardiovascular: Negative.    Gastrointestinal: Negative.         Right groin bulge   Endocrine: Negative.    Genitourinary: Negative.    Musculoskeletal: Negative.    Skin: Negative.    Allergic/Immunologic: Negative.    Neurological: Negative.    Hematological: Negative.    Psychiatric/Behavioral: Negative.       Current Outpatient Medications   Medication Sig Dispense Refill    aspirin (ECOTRIN) 81 MG EC tablet Take 1 tablet (81 mg total) by mouth once daily. With food 30 tablet 0    diphenhydrAMINE-aluminum-magnesium hydroxide-simethicone-LIDOcaine HCl 2% Swish and swallow 10 mLs every 4 (four) hours as needed (throat pain). 500 mL 0    ibuprofen (ADVIL,MOTRIN) 800 MG tablet TAKE 1 TABLET(800 MG) BY MOUTH TWICE DAILY AS NEEDED FOR PAIN 60 tablet 0    metronidazole 0.75% (METROCREAM) 0.75 % Crea ASTON EXT AA BID      multivitamin (THERAGRAN) per tablet Take 1 tablet by mouth once daily. Hold 5 days prior to surgery      pantoprazole (PROTONIX) 40 MG tablet Take 1 tablet (40 mg total) by mouth 2 (two) times daily. 60 tablet 11    sildenafiL (VIAGRA) 100 MG tablet TAKE 1 TABLET BY MOUTH ONCE DAILY AS NEEDED 30 tablet 0    triamcinolone acetonide 0.1% (KENALOG) 0.1 % Lotn APPLY SMALL AMOUNT TOPICALLY TO THE  AFFECTED AREA TWICE DAILY FOR 1 TO 2 WEEKS. DO NOT APPLY TO FACE OR GROIN      anastrozole (ARIMIDEX) 1 mg Tab        No current facility-administered medications for this visit.     Facility-Administered Medications Ordered in Other Visits   Medication Dose Route Frequency Provider Last Rate Last Admin    lactated ringers infusion   Intravenous Continuous Soha Norton MD   New Bag at 05/18/22 1033       Review of patient's allergies indicates:   Allergen Reactions    Tramadol Other (See Comments)     dizziness       Past Medical History:   Diagnosis Date    Allergy     Hypotension 11/18/2019    Hypotension, iatrogenic     Kidney stone     Left shoulder pain 12/09/2019    Liver cyst     Obesity     Respiratory arrest 11/10/2017    Valium and percocet caused this.    Sensitivity to medication 11/18/2019    Thyroid disease        Past Surgical History:   Procedure Laterality Date    ARTHROSCOPIC DEBRIDEMENT OF SHOULDER Left 12/9/2019    Procedure: DEBRIDEMENT, SHOULDER, ARTHROSCOPIC;  Surgeon: Lenny Patton MD;  Location: [facility];  Service: Orthopedics;  Laterality: Left;    ARTHROSCOPY OF SHOULDER WITH DECOMPRESSION OF SUBACROMIAL SPACE Left 12/9/2019    Procedure: ARTHROSCOPY, SHOULDER, WITH SUBACROMIAL SPACE DECOMPRESSION;  Surgeon: Lenny Patton MD;  Location: [facility] OR;  Service: Orthopedics;  Laterality: Left;    BICEPS TENDON REPAIR Left 12/9/2019    Procedure: REPAIR, TENDON, BICEPS;  Surgeon: Lenny Patton MD;  Location: [facility] OR;  Service: Orthopedics;  Laterality: Left;  arthroscopic bicep tenodesis    CHONDROPLASTY OF SHOULDER Left 12/9/2019    Procedure: CHONDROPLASTY, SHOULDER;  Surgeon: Lenny Patton MD;  Location: [facility] OR;  Service: Orthopedics;  Laterality: Left;  arthroscopic    COLONOSCOPY N/A 5/13/2019    Procedure: COLONOSCOPY;  Surgeon: Soha Norton MD;  Location: [facility];  Service: Endoscopy;  Laterality: N/A;    ESOPHAGOGASTRODUODENOSCOPY N/A 5/18/2022     Procedure: EGD (ESOPHAGOGASTRODUODENOSCOPY);  Surgeon: Soha Norton MD;  Location: [facility] ENDO;  Service: Endoscopy;  Laterality: N/A;    EXAMINATION UNDER ANESTHESIA Left 12/9/2019    Procedure: EXAM UNDER ANESTHESIA;  Surgeon: Lenny Patton MD;  Location: [facility] OR;  Service: Orthopedics;  Laterality: Left;    GANGLION CYST EXCISION Left     INJECTION OF JOINT Left 7/25/2019    Procedure: Left shoulder Joint injection with local;  Surgeon: Leonardo Collado MD;  Location: [facility] PAIN [facility]GT;  Service: Pain Management;  Laterality: Left;    INJECTION OF JOINT Left 12/28/2020    Procedure: Left shoulder Joint injection;  Surgeon: Tanmay Weathers MD;  Location: [facility];  Service: Pain Management;  Laterality: Left;    SINUS SURGERY  2007    TONSILLECTOMY, ADENOIDECTOMY, BILATERAL MYRINGOTOMY AND TUBES      TRANSFORAMINAL EPIDURAL INJECTION OF STEROID Right 3/4/2021    Procedure: Right L5/S1 +/- L4/5 TF NOEMY;  Surgeon: Tanmay Weathers MD;  Location: [facility]IN [facility];  Service: Pain Management;  Laterality: Right;    VASECTOMY  10/27/2017       Family History   Problem Relation Age of Onset    Hypertension Mother     Prostate cancer Father     Hyperlipidemia Father        Social History     Socioeconomic History    Marital status:     Number of children: 3   Occupational History     Employer:  [employer]   Tobacco Use    Smoking status: Never    Smokeless tobacco: Never   Substance and Sexual Activity    Alcohol use: Yes     Comment: monthly    Drug use: No       Vitals:    02/27/23 1636   BP: 122/71   Pulse: 71   Temp: 98.2 °F (36.8 °C)       Physical Exam  Vitals reviewed.   Constitutional:       General: He is not in acute distress.     Appearance: He is well-developed.   HENT:      Head: Normocephalic and atraumatic.   Eyes:      General: No scleral icterus.     Pupils: Pupils are equal, round, and reactive to light.   Neck:      Thyroid: No thyromegaly.      Vascular: No JVD.      Trachea: No  tracheal deviation.   Cardiovascular:      Rate and Rhythm: Normal rate and regular rhythm.      Heart sounds: Normal heart sounds.   Pulmonary:      Effort: Pulmonary effort is normal.      Breath sounds: Normal breath sounds.   Abdominal:      General: Abdomen is flat. Bowel sounds are normal. There is no distension.      Palpations: Abdomen is soft. There is no mass.      Tenderness: There is no abdominal tenderness. There is no guarding or rebound.      Hernia: A hernia (Reducible right inguinal, no evidence of a left inguinal) is present.   Musculoskeletal:         General: Normal range of motion.      Cervical back: Normal range of motion and neck supple.   Lymphadenopathy:      Cervical: No cervical adenopathy.   Skin:     General: Skin is warm and dry.   Neurological:      Mental Status: He is alert and oriented to person, place, and time.   Psychiatric:         Behavior: Behavior normal.         Thought Content: Thought content normal.         Judgment: Judgment normal.     Assessment & Plan:       Unilateral right inguinal hernia without gangrene or obstruction.      No clinical evidence of a left inguinal hernia.      Robotic right inguinal hernia repair with mesh.  The rationale for the robotic approach the use of mesh was discussed.      Should the patient be found to have a left inguinal hernia that will be addressed at the same setting.  This was discussed with him.      Risk of hernia repair includes infection, bleeding, mesh infection, testicular atrophy, and pain or numbness in the groin that may be long-lasting.      We will schedule surgery for the near future.  He would need a CBC, CMP an EKG prior to surgery.  He would need to stop his aspirin for 7 days prior to surgery

## 2023-02-27 NOTE — PATIENT INSTRUCTIONS
I do surgeries on Tuesdays Wednesdays and Fridays.  You can call the office or send us a message after you discuss a day for surgery with your wife.  I would recommend no strenuous lifting for about 4 weeks after the surgery.      With regard to the instructions we would ask you to stop your aspirin about a week before the surgery.      There would be no solid food after midnight on the day before the surgery and clear liquids up to 3 hours before arriving at the hospital.      Please feel free to contact us by phone or using the [employer] message [employer] cyst      Our office phone numbers are  [phone] and

## 2023-07-12 PROBLEM — R10.32 LEFT LOWER QUADRANT ABDOMINAL PAIN: Status: ACTIVE | Noted: 2023-07-12

## 2023-07-12 PROBLEM — R93.5 ABNORMAL CT OF THE ABDOMEN: Status: ACTIVE | Noted: 2023-07-12

## 2023-07-12 PROBLEM — I89.0 LYMPHEDEMA: Status: ACTIVE | Noted: 2023-07-12

## 2023-07-21 ENCOUNTER — HOSPITAL ENCOUNTER (OUTPATIENT)
Dept: RADIOLOGY | Facility: HOSPITAL | Age: 58
Discharge: HOME | End: 2023-07-21
Attending: NURSE PRACTITIONER
Payer: OTHER GOVERNMENT

## 2023-07-21 DIAGNOSIS — K76.9 LIVER DISEASE, UNSPECIFIED: ICD-10-CM

## 2023-07-21 DIAGNOSIS — K21.9 LARYNGOPHARYNGEAL REFLUX (LPR): ICD-10-CM

## 2023-07-21 DIAGNOSIS — R93.5 ABNORMAL CT OF THE ABDOMEN: ICD-10-CM

## 2023-07-21 DIAGNOSIS — I89.0 LYMPHEDEMA: ICD-10-CM

## 2023-07-21 PROCEDURE — 78815 NM PET CT ROUTINE: ICD-10-PCS | Mod: 26,PI,, | Performed by: RADIOLOGY

## 2023-07-21 PROCEDURE — A9552 F18 FDG: HCPCS

## 2023-07-21 PROCEDURE — 78815 PET IMAGE W/CT SKULL-THIGH: CPT | Mod: 26,PI,, | Performed by: RADIOLOGY

## 2023-07-25 RX ORDER — PANTOPRAZOLE SODIUM 40 MG/1
40 TABLET, DELAYED RELEASE ORAL 2 TIMES DAILY
Qty: 60 TABLET | Refills: 0 | Status: SHIPPED | OUTPATIENT
Start: 2023-07-25

## 2023-08-08 ENCOUNTER — LAB VISIT (OUTPATIENT)
Dept: LAB | Facility: HOSPITAL | Age: 58
End: 2023-08-08
Attending: NURSE PRACTITIONER
Payer: OTHER GOVERNMENT
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
## 2023-08-08 PROCEDURE — 36415 COLL VENOUS BLD VENIPUNCTURE: CPT | Mod: PO | Performed by: NURSE PRACTITIONER

## 2023-08-08 PROCEDURE — 86618 LYME DISEASE ANTIBODY: CPT | Performed by: NURSE PRACTITIONER

## 2023-08-11 LAB — B BURGDOR AB SER IA-ACNC: 0.14 INDEX VALUE

## (undated) DEVICE — ELECTRODE COOLPULSE 90 W/HAND

## (undated) DEVICE — SEE MEDLINE ITEM 147518

## (undated) DEVICE — PACK FLUID CONTROL SHOULDER

## (undated) DEVICE — SUT PROLENE 2-0 FSLX BL

## (undated) DEVICE — DRESSING XEROFORM FOIL PK 1X8

## (undated) DEVICE — Device

## (undated) DEVICE — SKIN MARKER DEVON 160

## (undated) DEVICE — MANIFOLD 4 PORT

## (undated) DEVICE — PAD ABD 8X10 STERILE

## (undated) DEVICE — SUT PROLENE 3-0 PS-1 BL 18

## (undated) DEVICE — APPLICATOR CHLORAPREP ORN 26ML

## (undated) DEVICE — DRAPE INCISE IOBAN 2 23X17IN

## (undated) DEVICE — BLADE COOLCUT EXCALIBER 4X13

## (undated) DEVICE — GLOVE SURG ULTRA TOUCH 7.5

## (undated) DEVICE — TUBING CROSSFLOW INFLOW

## (undated) DEVICE — GOWN SMARTGOWN LVL4 X-LONG XL

## (undated) DEVICE — UNDERGLOVES BIOGEL PI SZ 7 LF

## (undated) DEVICE — COVER CAMERA OPERATING ROOM

## (undated) DEVICE — DRESSING TRANS 4X4 TEGADERM

## (undated) DEVICE — NDL SUREFIRE SCORPION RC

## (undated) DEVICE — KIT ANTIFOG

## (undated) DEVICE — INSTRAMOD SHOULDER TRACTION

## (undated) DEVICE — TIP SUCTION YANKAUER

## (undated) DEVICE — SEE MEDLINE ITEM 157117

## (undated) DEVICE — SOL IRR NACL .9% 3000ML

## (undated) DEVICE — SEE MEDLINE ITEM 152622

## (undated) DEVICE — SUT VICRYL 3-0 27 SH

## (undated) DEVICE — DRAPE STERI U-SHAPED 47X51IN

## (undated) DEVICE — SEE MEDLINE ITEM 152529

## (undated) DEVICE — SEE MEDLINE ITEM 146420

## (undated) DEVICE — SYR 30CC LUER LOCK

## (undated) DEVICE — DRAPE PLASTIC U 60X72

## (undated) DEVICE — BLADE POWERASP 4.0MMX13CM

## (undated) DEVICE — SEE MEDLINE ITEM 157027

## (undated) DEVICE — NDL SPINAL 18GX3.5 SPINOCAN

## (undated) DEVICE — SUT PROLENE 2-0 SH 36IN BLU

## (undated) DEVICE — SEE MEDLINE ITEM 157131

## (undated) DEVICE — GLOVE BIOGEL PI MICRO SZ 7.5

## (undated) DEVICE — GAUZE SPONGE 4X4 12PLY

## (undated) DEVICE — SLEEVE LATERAL TRACTION ARM

## (undated) DEVICE — SUT VICRYL PLUS 0 CT1 36IN

## (undated) DEVICE — DECANTER VIAL ASEPTIC TRANSFER

## (undated) DEVICE — TAPE SURGICAL MICROFOAM 3IN

## (undated) DEVICE — GLOVE SURG ULTRA TOUCH 7

## (undated) DEVICE — ADHESIVE DERMABOND ADVANCED

## (undated) DEVICE — SUT VICRYL CTD 2-0 GI 27 SH

## (undated) DEVICE — DRAPE SURG W/TWL 17 5/8X23

## (undated) DEVICE — SYR 50CC LL

## (undated) DEVICE — SEE MEDLINE ITEM 152739